# Patient Record
Sex: FEMALE | Race: WHITE | NOT HISPANIC OR LATINO | Employment: FULL TIME
[De-identification: names, ages, dates, MRNs, and addresses within clinical notes are randomized per-mention and may not be internally consistent; named-entity substitution may affect disease eponyms.]

---

## 2017-10-07 ENCOUNTER — HEALTH MAINTENANCE LETTER (OUTPATIENT)
Age: 37
End: 2017-10-07

## 2017-11-11 ENCOUNTER — HEALTH MAINTENANCE LETTER (OUTPATIENT)
Age: 37
End: 2017-11-11

## 2018-05-03 ENCOUNTER — OFFICE VISIT (OUTPATIENT)
Dept: FAMILY MEDICINE | Facility: CLINIC | Age: 38
End: 2018-05-03
Payer: COMMERCIAL

## 2018-05-03 VITALS
HEART RATE: 76 BPM | WEIGHT: 235 LBS | SYSTOLIC BLOOD PRESSURE: 120 MMHG | DIASTOLIC BLOOD PRESSURE: 68 MMHG | TEMPERATURE: 98.6 F | BODY MASS INDEX: 33.64 KG/M2 | HEIGHT: 70 IN

## 2018-05-03 DIAGNOSIS — Z00.00 ROUTINE GENERAL MEDICAL EXAMINATION AT A HEALTH CARE FACILITY: Primary | ICD-10-CM

## 2018-05-03 DIAGNOSIS — Z30.46 ENCOUNTER FOR SURVEILLANCE OF IMPLANTABLE SUBDERMAL CONTRACEPTIVE: ICD-10-CM

## 2018-05-03 DIAGNOSIS — Z12.4 SCREENING FOR MALIGNANT NEOPLASM OF CERVIX: ICD-10-CM

## 2018-05-03 DIAGNOSIS — Z11.3 SCREEN FOR STD (SEXUALLY TRANSMITTED DISEASE): ICD-10-CM

## 2018-05-03 PROCEDURE — 87591 N.GONORRHOEAE DNA AMP PROB: CPT | Performed by: FAMILY MEDICINE

## 2018-05-03 PROCEDURE — 87491 CHLMYD TRACH DNA AMP PROBE: CPT | Performed by: FAMILY MEDICINE

## 2018-05-03 PROCEDURE — 88175 CYTOPATH C/V AUTO FLUID REDO: CPT | Performed by: FAMILY MEDICINE

## 2018-05-03 PROCEDURE — G0476 HPV COMBO ASSAY CA SCREEN: HCPCS | Performed by: FAMILY MEDICINE

## 2018-05-03 PROCEDURE — 99395 PREV VISIT EST AGE 18-39: CPT | Performed by: FAMILY MEDICINE

## 2018-05-03 NOTE — MR AVS SNAPSHOT
After Visit Summary   5/3/2018    Shannen Guzmán    MRN: [id number]           Patient Information     Date Of Birth          1980        Visit Information        Provider Department      5/3/2018 2:20 PM Maricarmen Irving MD [facility]        Today's Diagnoses     Routine general medical examination at a health care facility    -  1    Encounter for surveillance of implantable subdermal contraceptive        Screening for malignant neoplasm of cervix        Screen for STD (sexually transmitted disease)          Care Instructions      Preventive Health Recommendations  Female Ages 26 - 39  Yearly exam:   See your health care provider every year in order to    Review health changes.     Discuss preventive care.      Review your medicines if you your doctor has prescribed any.    Until age 30: Get a Pap test every three years (more often if you have had an abnormal result).    After age 30: Talk to your doctor about whether you should have a Pap test every 3 years or have a Pap test with HPV screening every 5 years.   You do not need a Pap test if your uterus was removed (hysterectomy) and you have not had cancer.  You should be tested each year for STDs (sexually transmitted diseases), if you're at risk.   Talk to your provider about how often to have your cholesterol checked.  If you are at risk for diabetes, you should have a diabetes test (fasting glucose).  Shots: Get a flu shot each year. Get a tetanus shot every 10 years.   Nutrition:     Eat at least 5 servings of fruits and vegetables each day.    Eat whole-grain bread, whole-wheat pasta and brown rice instead of white grains and rice.    Talk to your provider about Calcium and Vitamin D.     Lifestyle    Exercise at least 150 minutes a week (30 minutes a day, 5 days of the week). This will help you control your weight and prevent disease.    Limit alcohol to one drink per day.    No smoking.     Wear sunscreen  to prevent skin cancer.    See your dentist every six months for an exam and cleaning.  [facility]   Discharged by : Della Pappas CMA  Paper scripts provided to patient : no     If you have any questions regarding your visit please contact your care team:     Team Gold                Clinic Hours Telephone Number     Dr. Brandi Gonzalez, NP 7am-7pm  Monday - Thursday   7am-5pm  Fridays  [phone]   (Appointment scheduling available 24/7)     RN Line  [phone] option 2     [facility] [place] [facility] 11am-9pm Monday-Friday Saturday-Sunday- 9am-5pm     [place] -   5pm-9pm Monday-Friday Saturday-Sunday- 9am-5pm    [phone] - [place]    [phone] - [place]       For a Price Quote for your services, please call our Consumer Price Line at [phone].     What options do I have for visits at the clinic other than the traditional office visit?     To expand how we care for you, many of our providers are utilizing electronic visits (e-visits) and telephone visits, when medically appropriate, for interactions with their patients rather than a visit in the clinic. We also offer nurse visits for many medical concerns. Just like any other service, we will bill your insurance company for this type of visit based on time spent on the phone with your provider. Not all insurance companies cover these visits. Please check with your medical insurance if this type of visit is covered. You will be responsible for any charges that are not paid by your insurance.   E-visits via [employer]: generally incur a $35.00 fee.     Telephone visits:  Time spent on the phone: *charged based on time that is spent on the phone in increments of 10 minutes. Estimated cost:   5-10 mins $30.00   11-20 mins. $59.00   21-30 mins. $85.00       Use [employer] (secure email communication and access to your  chart) to send your primary care provider a message or make an appointment. Ask someone on your Team how to sign up for [email or web address].     As always, Thank you for trusting us with your health care needs!      [facility]:    Scheduling Appointments  [place] [place]  Call: [phone]    [facility] Gallow[facility], [facility]  Call: [phone]    [facility]  Call: [phone]    For Gastroenterology referrals   [facility]   Clinics and Surgery [place], 4th Floor   [address] [place] [address]   Appointments: [phone]    WHERE TO GO FOR CARE?  Clinic    Make an appointment if you:       Are sick (cold, cough, flu, sore throat, earache or in pain).       Have a small injury (sprain, small cut, burn or broken bone).       Need a physical exam, Pap smear, vaccine or prescription refill.       Have questions about your health or medicines.    To reach us:      Call [phone] ([phone]). Open 24 hours every day. (For counseling services, call [phone].)    Log into [email or web address] at [email or web address]. (Visit [email or web address] to create an account.) Hospital emergency room    An emergency is a serious or life- threatening problem that must be treated right away.    Call [phone] or get to the hospital if you have:      Very bad or sudden:            - Chest pain or pressure         - Bleeding         - Head or belly pain         - Dizziness or trouble seeing, walking or                          Speaking      Problems breathing      Blood in your vomit or you are coughing up blood      A major injury (knocked out, loss of a finger or limb, rape, broken bone protruding from skin)    A mental health crisis. (Or call the Mental Health Crisis line at 1-140.519.7386 or Suicide Prevention Hotline at 1-612.801.4810.)    Open 24 hours every day. You don't need an appointment.     Urgent care    Visit urgent care for sickness or  small injuries when the clinic is closed. You don't need an appointment. To check hours or find an urgent care near you, visit [email or web address]. Online care    [facility] from [facility] for more than 70 common problems, like colds, allergies and infections. Open 24 hours every day at:   [email or web address]   Need help deciding?    For advice about where to be seen, you may call your clinic and ask to speak with a nurse. We're here for you 24 hours every day.         If you are deaf or hard of hearing, please let us know. We provide many free services including sign language interpreters, oral interpreters, TTYs, telephone amplifiers, note takers and written materials.                   Follow-ups after your visit        Your next 10 appointments already scheduled     May 23, 2018 10:30 AM CDT   Office Visit with Shannen Viramontes MD   [facility] ([facility]    [address] [place] [id number] [phone]           Bring a current list of meds and any records pertaining to this visit. For Physicals, please bring immunization records and any forms needing to be filled out. Please arrive 10 minutes early to complete paperwork.              Who to contact     If you have questions or need follow up information about today's clinic visit or your schedule please contact [facility] directly at [phone].  Normal or non-critical lab and imaging results will be communicated to you by MyChart, letter or phone within 4 business days after the clinic has received the results. If you do not hear from us within 7 days, please contact the clinic through MyChart or phone. If you have a critical or abnormal lab result, we will notify you by phone as soon as possible.  Submit refill requests through [employer] or call your pharmacy and they will forward the refill request to us. Please allow 3 business days for your refill to be completed.           "Additional Information About Your Visit        MyChart Information     [employer] gives you secure access to your electronic health record. If you see a primary care provider, you can also send messages to your care team and make appointments. If you have questions, please call your primary care clinic.  If you do not have a primary care provider, please call [phone] and they will assist you.        Care EveryWhere ID     This is your Care EveryWhere ID. This could be used by other organizations to access your [facility] medical records  [id number]        Your Vitals Were     Pulse Temperature Height Last Period BMI (Body Mass Index)       76 98.6  F (37  C) (Oral) 5' 9.5\" (1.765 m) 05/03/2018 34.21 kg/m2        Blood Pressure from Last 3 Encounters:   05/03/18 120/68   09/02/16 110/74   11/06/15 124/68    Weight from Last 3 Encounters:   05/03/18 235 lb (106.6 kg)   09/02/16 262 lb (118.8 kg)   11/06/15 255 lb (115.7 kg)              We Performed the Following     CHLAMYDIA TRACHOMATIS PCR     HPV High Risk Types DNA Cervical     NEISSERIA GONORRHOEA PCR     Pap imaged thin layer diagnostic with HPV (select HPV order below)        Primary Care Provider Office Phone # Fax #    Maricarmen Rebecca Irving [phone]       [facility] [address]        Equal Access to Services     ALESIA FRANCISCO : Hadii tana verma Solenny, waaxda luqadaha, qaybta kaalmada gera, josephine mon . So [facility] [phone].    ATENCIÓN: Si habla español, tiene a multani disposición servicios gratuitos de asistencia lingüística. Llame al [phone].    We comply with applicable federal civil rights laws and [place] laws. We do not discriminate on the basis of race, color, national origin, age, disability, sex, sexual orientation, or gender identity.            Thank you!     Thank you for choosing [facility]  for your care. Our goal is always to provide " you with excellent care. Hearing back from our patients is one way we can continue to improve our services. Please take a few minutes to complete the written survey that you may receive in the mail after your visit with us. Thank you!             Your Updated Medication List - Protect others around you: Learn how to safely use, store and throw away your medicines at www.disposemymeds.org.          This list is accurate as of 5/3/18  3:07 PM.  Always use your most recent med list.                   Brand Name Dispense Instructions for use Diagnosis    etonogestrel 68 MG Impl    IMPLANON    1 each    1 each (68 mg) by Subdermal route once for 1 dose    Nexplanon insertion

## 2018-05-03 NOTE — NURSING NOTE
"Chief Complaint   Patient presents with     Physical     Initial /68 (BP Location: Right arm, Patient Position: Sitting, Cuff Size: Adult Large)  Pulse 76  Temp 98.6  F (37  C) (Oral)  Ht 5' 9.5\" (1.765 m)  Wt 235 lb (106.6 kg)  LMP 05/03/2018  BMI 34.21 kg/m2 Estimated body mass index is 34.21 kg/(m^2) as calculated from the following:    Height as of this encounter: 5' 9.5\" (1.765 m).    Weight as of this encounter: 235 lb (106.6 kg).  BP completed using cuff size: large-right arm.    Fatuma Vega MA      "
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
## 2018-05-03 NOTE — PATIENT INSTRUCTIONS
Preventive Health Recommendations  Female Ages 26 - 39  Yearly exam:   See your health care provider every year in order to    Review health changes.     Discuss preventive care.      Review your medicines if you your doctor has prescribed any.    Until age 30: Get a Pap test every three years (more often if you have had an abnormal result).    After age 30: Talk to your doctor about whether you should have a Pap test every 3 years or have a Pap test with HPV screening every 5 years.   You do not need a Pap test if your uterus was removed (hysterectomy) and you have not had cancer.  You should be tested each year for STDs (sexually transmitted diseases), if you're at risk.   Talk to your provider about how often to have your cholesterol checked.  If you are at risk for diabetes, you should have a diabetes test (fasting glucose).  Shots: Get a flu shot each year. Get a tetanus shot every 10 years.   Nutrition:     Eat at least 5 servings of fruits and vegetables each day.    Eat whole-grain bread, whole-wheat pasta and brown rice instead of white grains and rice.    Talk to your provider about Calcium and Vitamin D.     Lifestyle    Exercise at least 150 minutes a week (30 minutes a day, 5 days of the week). This will help you control your weight and prevent disease.    Limit alcohol to one drink per day.    No smoking.     Wear sunscreen to prevent skin cancer.    See your dentist every six months for an exam and cleaning.  [facility]   Discharged by : Della Pappas CMA  Paper scripts provided to patient : no     If you have any questions regarding your visit please contact your care team:     Team Gold                Clinic Hours Telephone Number     Dr. Brandi Gonzalez NP 7am-7pm  Monday - Thursday   7am-5pm  Fridays  [phone]   (Appointment scheduling available 24/7)     RN Line  [phone] option 2      Urgent Care - [place] and [place] - 11am-9pm Monday-Friday Saturday-Sunday- 9am-5pm     [place] -   5pm-9pm Monday-Friday Saturday-Sunday- 9am-5pm    [phone] - [place]    [phone] - [place]       For a Price Quote for your services, please call our Consumer Price Line at [phone].     What options do I have for visits at the clinic other than the traditional office visit?     To expand how we care for you, many of our providers are utilizing electronic visits (e-visits) and telephone visits, when medically appropriate, for interactions with their patients rather than a visit in the clinic. We also offer nurse visits for many medical concerns. Just like any other service, we will bill your insurance company for this type of visit based on time spent on the phone with your provider. Not all insurance companies cover these visits. Please check with your medical insurance if this type of visit is covered. You will be responsible for any charges that are not paid by your insurance.   E-visits via POWhart: generally incur a $35.00 fee.     Telephone visits:  Time spent on the phone: *charged based on time that is spent on the phone in increments of 10 minutes. Estimated cost:   5-10 mins $30.00   11-20 mins. $59.00   21-30 mins. $85.00       Use POWhart (secure email communication and access to your chart) to send your primary care provider a message or make an appointment. Ask someone on your Team how to sign up for Diversiont.     As always, Thank you for trusting us with your health care needs!      [facility]:    Scheduling Appointments  [facility]  Call: [phone]    [facility]  Call: [phone]    [facility]  Call: [phone]    For Gastroenterology referrals   [facility]   Clinics and Surgery Center, 4th Floor   [address] [place] [address]    Appointments: [phone]    WHERE TO GO FOR CARE?  Clinic    Make an appointment if you:       Are sick (cold, cough, flu, sore throat, earache or in pain).       Have a small injury (sprain, small cut, burn or broken bone).       Need a physical exam, Pap smear, vaccine or prescription refill.       Have questions about your health or medicines.    To reach us:      Call [phone] ([phone]). Open 24 hours every day. (For counseling services, call [phone].)    Log into [employer] at X[employer]. (Visit [employer] to create an account.) Hospital emergency room    An emergency is a serious or life- threatening problem that must be treated right away.    Call 701 or get to the hospital if you have:      Very bad or sudden:            - Chest pain or pressure         - Bleeding         - Head or belly pain         - Dizziness or trouble seeing, walking or                          Speaking      Problems breathing      Blood in your vomit or you are coughing up blood      A major injury (knocked out, loss of a finger or limb, rape, broken bone protruding from skin)    A mental health crisis. (Or call the Mental Health Crisis line at 1-998.578.8974 or Suicide Prevention Hotline at 1-123.685.8095.)    Open 24 hours every day. You don't need an appointment.     Urgent care    Visit urgent care for sickness or small injuries when the clinic is closed. You don't need an appointment. To check hours or find an urgent care near you, visit [email or web address]. Online care    Get online care from [facility] for more than 70 common problems, like colds, allergies and infections. Open 24 hours every day at:   [email or web address]   Need help deciding?    For advice about where to be seen, you may call your clinic and ask to speak with a nurse. We're here for you 24 hours every day.         If you are deaf or hard of hearing, please let us know. We provide many free services including sign language  interpreters, oral interpreters, TTYs, telephone amplifiers, note takers and written materials.

## 2018-05-04 LAB
C TRACH DNA SPEC QL NAA+PROBE: NEGATIVE
N GONORRHOEA DNA SPEC QL NAA+PROBE: NEGATIVE
SPECIMEN SOURCE: NORMAL
SPECIMEN SOURCE: NORMAL

## 2018-05-07 LAB
COPATH REPORT: NORMAL
PAP: NORMAL

## 2018-05-09 LAB
FINAL DIAGNOSIS: ABNORMAL
HPV HR 12 DNA CVX QL NAA+PROBE: POSITIVE
HPV16 DNA SPEC QL NAA+PROBE: NEGATIVE
HPV18 DNA SPEC QL NAA+PROBE: NEGATIVE
SPECIMEN DESCRIPTION: ABNORMAL
SPECIMEN SOURCE CVX/VAG CYTO: ABNORMAL

## 2018-05-23 ENCOUNTER — OFFICE VISIT (OUTPATIENT)
Dept: OBGYN | Facility: CLINIC | Age: 38
End: 2018-05-23
Payer: COMMERCIAL

## 2018-05-23 VITALS
BODY MASS INDEX: 32.78 KG/M2 | SYSTOLIC BLOOD PRESSURE: 128 MMHG | WEIGHT: 229 LBS | TEMPERATURE: 98.8 F | DIASTOLIC BLOOD PRESSURE: 70 MMHG | HEIGHT: 70 IN

## 2018-05-23 DIAGNOSIS — Z30.46 NEXPLANON REMOVAL: ICD-10-CM

## 2018-05-23 DIAGNOSIS — Z30.017 NEXPLANON INSERTION: Primary | ICD-10-CM

## 2018-05-23 LAB — BETA HCG QUAL IFA URINE: NEGATIVE

## 2018-05-23 PROCEDURE — 84703 CHORIONIC GONADOTROPIN ASSAY: CPT | Performed by: OBSTETRICS & GYNECOLOGY

## 2018-05-23 PROCEDURE — 11983 REMOVE/INSERT DRUG IMPLANT: CPT | Performed by: OBSTETRICS & GYNECOLOGY

## 2018-05-23 NOTE — NURSING NOTE
"Chief Complaint   Patient presents with     Contraception     nexplanon replacement.     Other     Nexplanon. NDC: 0827-0663-20 LOT: [id number] EXP: 10/2020       Initial /70  Temp 98.8  F (37.1  C) (Oral)  Ht 5' 9.5\" (1.765 m)  Wt 229 lb (103.9 kg)  LMP 2018  BMI 33.33 kg/m2 Estimated body mass index is 33.33 kg/(m^2) as calculated from the following:    Height as of this encounter: 5' 9.5\" (1.765 m).    Weight as of this encounter: 229 lb (103.9 kg).  BP completed using cuff size: large        The following HM Due: NONE      The following patient reported/Care Every where data was sent to:  P ABSTRACT QUALITY INITIATIVES [[id number]]        patient has appointment for today    Erika Dorantes CMA                "

## 2018-05-23 NOTE — PROGRESS NOTES
Procedure: Nexplanon removal  PARQ was held and consents signed. Patient was placed in dorsal supine position with rightt arm abducted and externally rotated. Nexplanon was palpated under skin. The area was cleansed with betadine. The distal site was injected with 1 ml of 1% plain lidocaine.  While pushing down on the proximal end, 2 mm incision was made over the distal implant with an 11 blade scalpel. The implant was grasped with a mosquito forceps and removed intact. The skin was closed with dermabond. A pressure bandage was placed for the next 6 hours. The patient tolerated the procedure well.       NEXPLANON INSERTION PROCEDURE    Shannen Guzmán is a 38 year old  who presents for Nexplanon insertion. Indication for nexplanon insertion is contraception. Patient's last menstrual period was 2018.. The patient is currently using Nexplanon  for contraception.     Tests:  UPT neg    A complete discussion of the risks and benefits of Nexplanon use and the details of the insertion procedure was held with the patient. The anticipated bleeding profile was specifically discussed and patient voiced understanding. All questions were answered. A consent form was signed.      Prior to the beginning of the procedure the team paused to verify the patient's identity, as well as the procedure to be performed and the correct side/site. All equipment required was ready and available. The patient was positioned appropriately.     Preprocedure medications: 1% plain lidocaine, 3 ml  Nexplanon Lot # NDC: [id number] LOT: [id number] EXP: 10/2020    Patient's allergies were confirmed. The patient was placed in the supine position with her right arm flexed at the elbow, externally rotated, and placed with her wrist parallel to her ear. The insertion site was identified 8-10 cm above the medial epicondyle of the humerus at the inner aspect of the arm. The insertion site was marked with a sterile marker. The direction of  insertion was also indicated with a kalpesh a few centimeters proximal. The insertion area was cleaned with betadine swabs and anesthetized with 2 ml of 1% lidocaine without epinephrine along the planned insertion tunnel.  The Nexplanon applicator was removed from its blister. The needle shield was removed, maintaining the applicator upright. The white implant was visualized in the needle tip. Counter-traction was applied to the skin at the marked needle insertion site.  The tip of the needle was inserted at the site, beveled side up, at a 30-degree angle. The applicator was then lowered to a horizontal position. While lifting the skin with the tip of the needle, the needle was inserted to its full length. The slider was unlocked and moved fully back to the stop.   The 4 cm crystal was palpated under the skin. The patient also palpated the crystal. A pressure bandage was applied with sterile gauze. The patient was instructed to remove the bangage in several hours and replace with a band-aid.    The user card was filled out and given to the patient to keep.  The Patient Chart Label was completed and sent for scanning.    PLAN:   The patient was asked to contact the clinic for any fever/chills/insertion site pain or heavy bleeding.     FOLLOW-UP:  She was asked to follow up for any problems.

## 2018-05-23 NOTE — MR AVS SNAPSHOT
"              After Visit Summary   5/23/2018    Shannen Guzmán    MRN: [id number]           Patient Information     Date Of Birth          1980        Visit Information        Provider Department      5/23/2018 10:30 Shannen Buckley MD [facility]        Today's Diagnoses     Nexplanon insertion    -  1    Nexplanon removal           Follow-ups after your visit        Who to contact     If you have questions or need follow up information about today's clinic visit or your schedule please contact [facility] directly at [phone].  Normal or non-critical lab and imaging results will be communicated to you by Atmoceanhart, letter or phone within 4 business days after the clinic has received the results. If you do not hear from us within 7 days, please contact the clinic through Atmoceanhart or phone. If you have a critical or abnormal lab result, we will notify you by phone as soon as possible.  Submit refill requests through [employer] or call your pharmacy and they will forward the refill request to us. Please allow 3 business days for your refill to be completed.          Additional Information About Your Visit        MyChart Information     [employer] gives you secure access to your electronic health record. If you see a primary care provider, you can also send messages to your care team and make appointments. If you have questions, please call your primary care clinic.  If you do not have a primary care provider, please call [phone] and they will assist you.        Care EveryWhere ID     This is your Care EveryWhere ID. This could be used by other organizations to access your [facility] medical records  [id number]        Your Vitals Were     Temperature Height Last Period BMI (Body Mass Index)          98.8  F (37.1  C) (Oral) 5' 9.5\" (1.765 m) 05/03/2018 33.33 kg/m2         Blood Pressure from Last 3 Encounters:   05/23/18 128/70   05/03/18 120/68   09/02/16 " 110/74    Weight from Last 3 Encounters:   05/23/18 229 lb (103.9 kg)   05/03/18 235 lb (106.6 kg)   09/02/16 262 lb (118.8 kg)              We Performed the Following     Beta HCG qual IFA urine     ETONOGESTREL IMPLANT SYSTEM     INSERTION NON-BIODEGRADABLE DRUG DELIVERY IMPLANT     REMOVE & REINSERT NON-BIODEGRADABLE DRUG DELIVERY IMPLANT          Today's Medication Changes          These changes are accurate as of 5/23/18 11:16 AM.  If you have any questions, ask your nurse or doctor.               These medicines have changed or have updated prescriptions.        Dose/Directions    * etonogestrel 68 MG Impl   Commonly known as:  IMPLANON   This may have changed:  Another medication with the same name was added. Make sure you understand how and when to take each.   Used for:  Nexplanon insertion   Changed by:  Shannen Viramontes MD        Dose:  1 each   1 each (68 mg) by Subdermal route once for 1 dose   Quantity:  1 each   Refills:  0       * etonogestrel 68 MG Impl   Commonly known as:  IMPLANON   This may have changed:  You were already taking a medication with the same name, and this prescription was added. Make sure you understand how and when to take each.   Used for:  Nexplanon insertion   Changed by:  Shannen Viramontes MD        Dose:  1 each   1 each (68 mg) by Subdermal route once for 1 dose   Quantity:  1 each   Refills:  0       * Notice:  This list has 2 medication(s) that are the same as other medications prescribed for you. Read the directions carefully, and ask your doctor or other care provider to review them with you.         Where to get your medicines      Some of these will need a paper prescription and others can be bought over the counter.  Ask your nurse if you have questions.     You don't need a prescription for these medications     etonogestrel 68 MG Impl                Primary Care Provider Office Phone # Fax #    Maricarmen Irving [phone]        [address] [facility] [address]        Equal Access to Services     ALESIA FRANCISCO : Hadii aad ku hadpiercesandrine Garcia, wapatrick barrera, qabolivar flowermadella boss, josephine haysjeanniedasha thompson. So [facility] [phone].    ATENCIÓN: Si habla español, tiene a multani disposición servicios gratuitos de asistencia lingüística. Llame al [phone].    We comply with applicable federal civil rights laws and [place] laws. We do not discriminate on the basis of race, color, national origin, age, disability, sex, sexual orientation, or gender identity.            Thank you!     Thank you for choosing [facility]  for your care. Our goal is always to provide you with excellent care. Hearing back from our patients is one way we can continue to improve our services. Please take a few minutes to complete the written survey that you may receive in the mail after your visit with us. Thank you!             Your Updated Medication List - Protect others around you: Learn how to safely use, store and throw away your medicines at www.disposemymeds.org.          This list is accurate as of 5/23/18 11:16 AM.  Always use your most recent med list.                   Brand Name Dispense Instructions for use Diagnosis    * etonogestrel 68 MG Impl    IMPLANON    1 each    1 each (68 mg) by Subdermal route once for 1 dose    Nexplanon insertion       * etonogestrel 68 MG Impl    IMPLANON    1 each    1 each (68 mg) by Subdermal route once for 1 dose    Nexplanon insertion       * Notice:  This list has 2 medication(s) that are the same as other medications prescribed for you. Read the directions carefully, and ask your doctor or other care provider to review them with you.

## 2018-08-16 ENCOUNTER — OFFICE VISIT (OUTPATIENT)
Dept: OBGYN | Facility: CLINIC | Age: 38
End: 2018-08-16
Payer: COMMERCIAL

## 2018-08-16 VITALS
HEIGHT: 70 IN | BODY MASS INDEX: 34.07 KG/M2 | SYSTOLIC BLOOD PRESSURE: 136 MMHG | DIASTOLIC BLOOD PRESSURE: 74 MMHG | WEIGHT: 238 LBS

## 2018-08-16 DIAGNOSIS — R87.810 CERVICAL HIGH RISK HPV (HUMAN PAPILLOMAVIRUS) TEST POSITIVE: Primary | ICD-10-CM

## 2018-08-16 LAB — BETA HCG QUAL IFA URINE: NEGATIVE

## 2018-08-16 PROCEDURE — 57456 ENDOCERV CURETTAGE W/SCOPE: CPT | Performed by: OBSTETRICS & GYNECOLOGY

## 2018-08-16 PROCEDURE — 84703 CHORIONIC GONADOTROPIN ASSAY: CPT | Performed by: OBSTETRICS & GYNECOLOGY

## 2018-08-16 PROCEDURE — 88305 TISSUE EXAM BY PATHOLOGIST: CPT | Performed by: OBSTETRICS & GYNECOLOGY

## 2018-08-16 NOTE — MR AVS SNAPSHOT
After Visit Summary   8/16/2018    Shannen Guzmán    MRN: [id number]           Patient Information     Date Of Birth          1980        Visit Information        Provider Department      8/16/2018 1:30 PM Shannen Viramontes MD; [facility] OB/[facility]        Today's Diagnoses     Cervical high risk HPV (human papillomavirus) test positive    -  1      Care Instructions    Colposcopy Post-Procedure Patient Instructions    You may experience any of the following for a couple of days following colposcopy:    Mild cramping    Vaginal bleeding     Vaginal discharge that looks black and clumpy    Please call your healthcare provider if you have any of the following symptoms:    Fever--Temperature greater than 100 degrees    Cramping after 48 hours    Bleeding heavier than a normal period in the first 24-48 hours    If you are bleeding and soaking more than one pad an hour    Or any other worrisome problems.    We recommend that:    You use pads, not tampons for the bleeding.    You may resume sexual activity in 2-3 days, or after bleeding stops.    You may use Tylenol or ibuprofen (Motrin or Advil) for any discomfort.      [facility] - OB/GYN : [phone]              Follow-ups after your visit        Who to contact     If you have questions or need follow up information about today's clinic visit or your schedule please contact [facility] directly at [phone].  Normal or non-critical lab and imaging results will be communicated to you by MyChart, letter or phone within 4 business days after the clinic has received the results. If you do not hear from us within 7 days, please contact the clinic through MyChart or phone. If you have a critical or abnormal lab result, we will notify you by phone as soon as possible.  Submit refill requests through [employer] or call your pharmacy and they will forward the refill request to us.  "Please allow 3 business days for your refill to be completed.          Additional Information About Your Visit        MyChart Information     Save22hart gives you secure access to your electronic health record. If you see a primary care provider, you can also send messages to your care team and make appointments. If you have questions, please call your primary care clinic.  If you do not have a primary care provider, please call [phone] and they will assist you.        Care EveryWhere ID     This is your Care EveryWhere ID. This could be used by other organizations to access your [facility] medical records  [id number]        Your Vitals Were     Height Last Period BMI (Body Mass Index)             5' 9.5\" (1.765 m) 08/06/2018 34.64 kg/m2          Blood Pressure from Last 3 Encounters:   08/16/18 136/74   05/23/18 128/70   05/03/18 120/68    Weight from Last 3 Encounters:   08/16/18 238 lb (108 kg)   05/23/18 229 lb (103.9 kg)   05/03/18 235 lb (106.6 kg)              We Performed the Following     Beta HCG qual IFA urine        Primary Care Provider Office Phone # Fax #    Maricarmen Rebecca Irving [phone]       [address] [facility] [address]        Equal Access to Services     ALESIA FRANCISCO : Hadii tana ku hadasho Soomaali, waaxda luqadaha, qaybta kaalmada adeegyada, waxay mayrain haychristinan viola thompson. So [facility] [phone].    ATENCIÓN: Si habla español, tiene a multani disposición servicios gratuitos de asistencia lingüística. Llame al [phone].    We comply with applicable federal civil rights laws and [place] laws. We do not discriminate on the basis of race, color, national origin, age, disability, sex, sexual orientation, or gender identity.            Thank you!     Thank you for choosing [facility]  for your care. Our goal is always to provide you with excellent care. Hearing back from our patients is one way we can continue to improve our services. " Please take a few minutes to complete the written survey that you may receive in the mail after your visit with us. Thank you!             Your Updated Medication List - Protect others around you: Learn how to safely use, store and throw away your medicines at www.disposemymeds.org.          This list is accurate as of 8/16/18  1:35 PM.  Always use your most recent med list.                   Brand Name Dispense Instructions for use Diagnosis    etonogestrel 68 MG Impl    IMPLANON    1 each    1 each (68 mg) by Subdermal route once for 1 dose    Nexplanon insertion

## 2018-08-16 NOTE — PROGRESS NOTES
Shannen Guzmán is a 38 year old female  who presents for repeat colposcopy, referred by Dr. Irving. Pap smear on 5/3/3/18 showed: normal and with high risk HPV present: not 16/18. The prior pap showed normal and with high risk HPV present: not 16/18.     07  ASCUS pap/ neg HR HPV. Plan: cotest in 3 years.   , , ,  All NIL paps.  14 NIL pap  16 NIL pap/+ HR HPV (not 16 or 18). Plan: cotest in 1 year, due by 9/2/17  5/3/18 NIL pap, + HR HPV (not 16 or 18). Plan: colp bef 8/3/18      No LMP recorded.  UPT today is negative  Patient does smoke  Type of contraception: nexplanon  Age at first sexual intercourse: 16  Number of sexual partners (lifetime): >6  Past GYN history: HPV  Prior cervical/vaginal disease: Normal exam without visible pathology.  Prior cervical treatment: no treatment.      PROCEDURE:      Before the procedure, it was ensured that the patient was educated regarding the nature of her findings to date, the implications, and what was to be done. She has been made aware of the role of HPV, the natural history of infection, ways to minimize her future risk, the effect of HPV on the cervix, and treatment options available should they be indicated. The details of the colposcopic procedure were reviewed. All questions were answered before proceeding, and informed consent was therefore obtained.      Speculum placed in vagina and excellent visualization of cervix acheived, cervix swabbed x 3 with acetic acid solution.      FINDINGS:  Cervix: no visible lesions  SCJ seen?: yes   ECC done?: Yes   Satisfactory examination?: no      ASSESSMENT: Normal exam without visible pathology.  PLAN: specimens labelled and sent to Pathology, will base further treatment on Pathology findings, post biopsy instructions given to patient, MyChart to discuss Pathology results and  Repeat pap smear in 12 months with cotest anticipated.      Shannen Viramontes MD

## 2018-08-16 NOTE — PATIENT INSTRUCTIONS

## 2018-08-16 NOTE — NURSING NOTE
"Chief Complaint   Patient presents with     Colposcopy       Initial /74  Ht 5' 9.5\" (1.765 m)  Wt 238 lb (108 kg)  LMP 2018  BMI 34.64 kg/m2 Estimated body mass index is 34.64 kg/(m^2) as calculated from the following:    Height as of this encounter: 5' 9.5\" (1.765 m).    Weight as of this encounter: 238 lb (108 kg).  BP completed using cuff size: large        The following HM Due: NONE      The following patient reported/Care Every where data was sent to:  P ABSTRACT QUALITY INITIATIVES [[id number]]        patient has appointment for today    Erika Dorantes CMA                "

## 2018-08-22 LAB — COPATH REPORT: NORMAL

## 2018-09-24 DIAGNOSIS — Z52.001 STEM CELL DONOR: ICD-10-CM

## 2018-09-24 DIAGNOSIS — Z86.2 PERSONAL HISTORY OF DISEASES OF BLOOD AND BLOOD-FORMING ORGANS: ICD-10-CM

## 2018-10-04 ENCOUNTER — OFFICE VISIT (OUTPATIENT)
Dept: TRANSPLANT | Facility: CLINIC | Age: 38
End: 2018-10-04

## 2018-10-04 VITALS
RESPIRATION RATE: 16 BRPM | HEART RATE: 104 BPM | HEIGHT: 69 IN | SYSTOLIC BLOOD PRESSURE: 138 MMHG | OXYGEN SATURATION: 97 % | BODY MASS INDEX: 35.09 KG/M2 | DIASTOLIC BLOOD PRESSURE: 84 MMHG | WEIGHT: 236.9 LBS | TEMPERATURE: 98.3 F

## 2018-10-04 VITALS
TEMPERATURE: 98.3 F | HEIGHT: 69 IN | DIASTOLIC BLOOD PRESSURE: 84 MMHG | RESPIRATION RATE: 16 BRPM | HEART RATE: 104 BPM | OXYGEN SATURATION: 97 % | WEIGHT: 236.9 LBS | SYSTOLIC BLOOD PRESSURE: 138 MMHG | BODY MASS INDEX: 35.09 KG/M2

## 2018-10-04 DIAGNOSIS — Z86.2 PERSONAL HISTORY OF DISEASES OF BLOOD AND BLOOD-FORMING ORGANS: ICD-10-CM

## 2018-10-04 DIAGNOSIS — Z52.001 DONOR OF STEM CELL: Primary | ICD-10-CM

## 2018-10-04 DIAGNOSIS — Z52.001 STEM CELL DONOR: Primary | ICD-10-CM

## 2018-10-04 DIAGNOSIS — Z52.001 STEM CELL DONOR: ICD-10-CM

## 2018-10-04 LAB
ABO + RH BLD: NORMAL
ABO + RH BLD: NORMAL
ALT SERPL W P-5'-P-CCNC: 19 U/L (ref 0–50)
BASOPHILS # BLD AUTO: 0 10E9/L (ref 0–0.2)
BASOPHILS NFR BLD AUTO: 0.3 %
BLD GP AB SCN SERPL QL: NORMAL
BLOOD BANK CMNT PATIENT-IMP: NORMAL
CREAT SERPL-MCNC: 0.77 MG/DL (ref 0.52–1.04)
DIFFERENTIAL METHOD BLD: NORMAL
EOSINOPHIL # BLD AUTO: 0.2 10E9/L (ref 0–0.7)
EOSINOPHIL NFR BLD AUTO: 2.2 %
ERYTHROCYTE [DISTWIDTH] IN BLOOD BY AUTOMATED COUNT: 13.5 % (ref 10–15)
GFR SERPL CREATININE-BSD FRML MDRD: 84 ML/MIN/1.7M2
HBV CORE AB SERPL QL IA: NONREACTIVE
HBV SURFACE AB SERPL IA-ACNC: 0.96 M[IU]/ML
HBV SURFACE AG SERPL QL IA: NONREACTIVE
HCG SERPL QL: NEGATIVE
HCT VFR BLD AUTO: 41.3 % (ref 35–47)
HCV AB SERPL QL IA: NONREACTIVE
HGB BLD-MCNC: 13.6 G/DL (ref 11.7–15.7)
HIV 1+2 AB+HIV1 P24 AG SERPL QL IA: NONREACTIVE
IMM GRANULOCYTES # BLD: 0 10E9/L (ref 0–0.4)
IMM GRANULOCYTES NFR BLD: 0.4 %
LYMPHOCYTES # BLD AUTO: 1.9 10E9/L (ref 0.8–5.3)
LYMPHOCYTES NFR BLD AUTO: 26.2 %
MCH RBC QN AUTO: 28.5 PG (ref 26.5–33)
MCHC RBC AUTO-ENTMCNC: 32.9 G/DL (ref 31.5–36.5)
MCV RBC AUTO: 86 FL (ref 78–100)
MONOCYTES # BLD AUTO: 0.3 10E9/L (ref 0–1.3)
MONOCYTES NFR BLD AUTO: 4.2 %
NEUTROPHILS # BLD AUTO: 4.8 10E9/L (ref 1.6–8.3)
NEUTROPHILS NFR BLD AUTO: 66.7 %
NRBC # BLD AUTO: 0 10*3/UL
NRBC BLD AUTO-RTO: 0 /100
PLATELET # BLD AUTO: 200 10E9/L (ref 150–450)
RBC # BLD AUTO: 4.78 10E12/L (ref 3.8–5.2)
SPECIMEN EXP DATE BLD: NORMAL
WBC # BLD AUTO: 7.1 10E9/L (ref 4–11)

## 2018-10-04 PROCEDURE — 86687 HTLV-I ANTIBODY: CPT

## 2018-10-04 PROCEDURE — 40000268 ZZH STATISTIC NO CHARGES: Mod: ZF

## 2018-10-04 PROCEDURE — 87389 HIV-1 AG W/HIV-1&-2 AB AG IA: CPT

## 2018-10-04 PROCEDURE — 86704 HEP B CORE ANTIBODY TOTAL: CPT

## 2018-10-04 PROCEDURE — 87516 HEPATITIS B DNA AMP PROBE: CPT

## 2018-10-04 PROCEDURE — 86850 RBC ANTIBODY SCREEN: CPT

## 2018-10-04 PROCEDURE — 86696 HERPES SIMPLEX TYPE 2 TEST: CPT

## 2018-10-04 PROCEDURE — 87798 DETECT AGENT NOS DNA AMP: CPT

## 2018-10-04 PROCEDURE — 87521 HEPATITIS C PROBE&RVRS TRNSC: CPT

## 2018-10-04 PROCEDURE — 87340 HEPATITIS B SURFACE AG IA: CPT

## 2018-10-04 PROCEDURE — 40000803 ZZHCL STATISTIC DNA ISOL HIGH PURITY

## 2018-10-04 PROCEDURE — 86803 HEPATITIS C AB TEST: CPT

## 2018-10-04 PROCEDURE — 86753 PROTOZOA ANTIBODY NOS: CPT

## 2018-10-04 PROCEDURE — 86703 HIV-1/HIV-2 1 RESULT ANTBDY: CPT

## 2018-10-04 PROCEDURE — 87535 HIV-1 PROBE&REVERSE TRNSCRPJ: CPT

## 2018-10-04 PROCEDURE — 85025 COMPLETE CBC W/AUTO DIFF WBC: CPT

## 2018-10-04 PROCEDURE — 86644 CMV ANTIBODY: CPT

## 2018-10-04 PROCEDURE — 82565 ASSAY OF CREATININE: CPT

## 2018-10-04 PROCEDURE — 86706 HEP B SURFACE ANTIBODY: CPT

## 2018-10-04 PROCEDURE — 86900 BLOOD TYPING SEROLOGIC ABO: CPT

## 2018-10-04 PROCEDURE — 86901 BLOOD TYPING SEROLOGIC RH(D): CPT

## 2018-10-04 PROCEDURE — 86780 TREPONEMA PALLIDUM: CPT

## 2018-10-04 PROCEDURE — 84460 ALANINE AMINO (ALT) (SGPT): CPT

## 2018-10-04 PROCEDURE — 86665 EPSTEIN-BARR CAPSID VCA: CPT

## 2018-10-04 PROCEDURE — 84703 CHORIONIC GONADOTROPIN ASSAY: CPT

## 2018-10-04 PROCEDURE — 86695 HERPES SIMPLEX TYPE 1 TEST: CPT

## 2018-10-04 ASSESSMENT — PAIN SCALES - GENERAL
PAINLEVEL: NO PAIN (1)
PAINLEVEL: NO PAIN (1)

## 2018-10-04 NOTE — MR AVS SNAPSHOT
After Visit Summary   10/4/2018    Shannen Guzmán    MRN: [id number]           Patient Information     Date Of Birth          1980        Visit Information        Provider Department      10/4/2018 2:30 PM  BMT [place] #1 [facility] Blood and Marrow Transplant        Today's Diagnoses     Donor of stem cell    -  1          [facility] [place]  [address] [place] [address]  Phone: [phone]  Clinic Hours:   Monday-Thursday:7am to 7pm   Friday: 7am to 5pm   Weekends and holidays:    8am to noon (in general)  If your fever is 100.5  or greater,   call the clinic.  After hours call the   hospital at [phone] or   [phone]. Ask for the BMT   fellow on-call           Care Instructions    No follow up to be scheduled.  She will come back when the nurse coordinators tells her to depending on closing recpient            Follow-ups after your visit        Who to contact     If you have questions or need follow up information about today's clinic visit or your schedule please contact [facility] BLOOD AND MARROW TRANSPLANT directly at [phone].  Normal or non-critical lab and imaging results will be communicated to you by [employer], letter or phone within 4 business days after the clinic has received the results. If you do not hear from us within 7 days, please contact the clinic through [employer] or phone. If you have a critical or abnormal lab result, we will notify you by phone as soon as possible.  Submit refill requests through [employer] or call your pharmacy and they will forward the refill request to us. Please allow 3 business days for your refill to be completed.          Additional Information About Your Visit        MyChart Information     Desall gives you secure access to your electronic health record. If you see a primary care provider, you can also send messages to your care team and make appointments. If you have questions, please call your primary care  "clinic.  If you do not have a primary care provider, please call [phone] and they will assist you.        Care EveryWhere ID     This is your Care EveryWhere ID. This could be used by other organizations to access your [place] medical records  [id number]        Your Vitals Were     Pulse Temperature Respirations Height Pulse Oximetry BMI (Body Mass Index)    104 98.3  F (36.8  C) (Oral) 16 1.74 m (5' 8.5\") 97% 35.5 kg/m2       Blood Pressure from Last 3 Encounters:   10/04/18 138/84   10/04/18 138/84   08/16/18 136/74    Weight from Last 3 Encounters:   10/04/18 107.5 kg (236 lb 14.4 oz)   10/04/18 107.5 kg (236 lb 14.4 oz)   08/16/18 108 kg (238 lb)              Today, you had the following     No orders found for display       Recent Review Flowsheet Data     BMT Recent Results Latest Ref Rng & Units 8/25/2011 9/13/2011 12/14/2011 4/3/2012 4/18/2012 4/20/2012 10/4/2018    WBC 4.0 - 11.0 10e9/L 7.8 6.4 - 8.4 6.9 - 7.1    Hemoglobin 11.7 - 15.7 g/dL 12.6 12.6 11.5(L) 13.1 12.8 12.1 13.6    Platelet Count 150 - 450 10e9/L 162 163 - 136(L) 118(L) - 200    Neutrophils (Absolute) 1.6 - 8.3 10e9/L 5.3 - - - 4.8 - 4.8    Sodium 133 - 144 mmol/L - - - - - - -    Potassium 3.4 - 5.3 mmol/L - - - - - - -    Chloride 94 - 109 mmol/L - - - - - - -    Glucose 60 - 99 mg/dL - - - - - - -    Urea Nitrogen 5 - 24 mg/dL - - - - - - -    Creatinine 0.52 - 1.04 mg/dL - - - - - - 0.77    Calcium (Total) 8.5 - 10.4 mg/dL - - - - - - -    ALT 0 - 50 U/L - - - - - - 19    MCV 78 - 100 fl 84 85 - 90 88 - 86               Primary Care Provider Office Phone # Fax #    Maricarmen Irving [phone]       [facility] [facility] [address]        Equal Access to Services     ALESIA FRANCISCO : Dmitri Garcia, sebastian barrera, josephine villagomez. [facility] [phone].    ATENCIÓN: Si habla español, tiene a multani disposición servicios gratuitos de " asistencia lingüística. Saray al [phone].    We comply with applicable federal civil rights laws and [place] laws. We do not discriminate on the basis of race, color, national origin, age, disability, sex, sexual orientation, or gender identity.            Thank you!     Thank you for choosing [facility] BLOOD AND MARROW TRANSPLANT  for your care. Our goal is always to provide you with excellent care. Hearing back from our patients is one way we can continue to improve our services. Please take a few minutes to complete the written survey that you may receive in the mail after your visit with us. Thank you!             Your Updated Medication List - Protect others around you: Learn how to safely use, store and throw away your medicines at www.disposemymeds.org.          This list is accurate as of 10/4/18  6:35 PM.  Always use your most recent med list.                   Brand Name Dispense Instructions for use Diagnosis    etonogestrel 68 MG Impl    IMPLANON    1 each    1 each (68 mg) by Subdermal route once for 1 dose    Nexplanon insertion

## 2018-10-04 NOTE — MR AVS SNAPSHOT
After Visit Summary   10/4/2018    Shannen Guzmán    MRN: [id number]           Patient Information     Date Of Birth          1980        Visit Information        Provider Department      10/4/2018 1:00 PM 1,  Bmt Nurse [facility] Blood and Marrow Transplant        Today's Diagnoses     Stem cell donor        Personal history of diseases of blood and blood-forming organs              [facility] [place] [place]  [address] [place] [address]  Phone: [phone]  Clinic Hours:   Monday-Thursday:7am to 7pm   Friday: 7am to 5pm   Weekends and holidays:    8am to noon (in general)  If your fever is 100.5  or greater,   call the clinic.  After hours call the   hospital at [phone] or   [phone]. Ask for the BMT   fellow on-call            Follow-ups after your visit        Who to contact     If you have questions or need follow up information about today's clinic visit or your schedule please contact [facility] BLOOD AND MARROW TRANSPLANT directly at [phone].  Normal or non-critical lab and imaging results will be communicated to you by [facility], letter or phone within 4 business days after the clinic has received the results. If you do not hear from us within 7 days, please contact the clinic through [employer] or phone. If you have a critical or abnormal lab result, we will notify you by phone as soon as possible.  Submit refill requests through [employer] or call your pharmacy and they will forward the refill request to us. Please allow 3 business days for your refill to be completed.          Additional Information About Your Visit        [employer] Guideshart Information     [employer] gives you secure access to your electronic health record. If you see a primary care provider, you can also send messages to your care team and make appointments. If you have questions, please call your primary care clinic.  If you do not have a primary care provider, please call [phone] and they  "will assist you.        Care EveryWhere ID     This is your Care EveryWhere ID. This could be used by other organizations to access your [place] medical records  [id number]        Your Vitals Were     Pulse Temperature Respirations Height Pulse Oximetry BMI (Body Mass Index)    104 98.3  F (36.8  C) (Oral) 16 1.74 m (5' 8.5\") 97% 35.49 kg/m2       Blood Pressure from Last 3 Encounters:   10/04/18 138/84   10/04/18 138/84   08/16/18 136/74    Weight from Last 3 Encounters:   10/04/18 107.5 kg (236 lb 14.4 oz)   10/04/18 107.5 kg (236 lb 14.4 oz)   08/16/18 108 kg (238 lb)              We Performed the Following     ABO/Rh type and screen     ALT     BMT Infectious Disease Donor Panel- SEND TO [facility]     CBC with platelets differential     Creatinine     EBV Capsid Antibody IgG - [facility]     HBV HCV HIV WNV by ANAND - SEND TO [facility]     Hepatitis B core antibody     Hepatitis B Surface Antibody     Hepatitis B surface antigen     Hepatitis C antibody     Herpes Simplex Virus 1 and 2 IgG -  Send to [facility]     HIV Antigen Antibody Combo     Order if female with child bearing potential: HCG qualitative     Pre BMT polymorphism determination donor        Recent Review Flowsheet Data     BMT Recent Results Latest Ref Rng & Units 8/25/2011 9/13/2011 12/14/2011 4/3/2012 4/18/2012 4/20/2012 10/4/2018    WBC 4.0 - 11.0 10e9/L 7.8 6.4 - 8.4 6.9 - 7.1    Hemoglobin 11.7 - 15.7 g/dL 12.6 12.6 11.5(L) 13.1 12.8 12.1 13.6    Platelet Count 150 - 450 10e9/L 162 163 - 136(L) 118(L) - 200    Neutrophils (Absolute) 1.6 - 8.3 10e9/L 5.3 - - - 4.8 - 4.8    Sodium 133 - 144 mmol/L - - - - - - -    Potassium 3.4 - 5.3 mmol/L - - - - - - -    Chloride 94 - 109 mmol/L - - - - - - -    Glucose 60 - 99 mg/dL - - - - - - -    Urea Nitrogen 5 - 24 mg/dL - - - - - - -    Creatinine 0.52 - 1.04 mg/dL - - - - - - 0.77    Calcium (Total) 8.5 - 10.4 mg/dL - - - - - - -    ALT 0 - 50 U/L - - - - - " - 19    MCV 78 - 100 fl 84 85 - 90 88 - 86               Primary Care Provider Office Phone # Fax #    Maricarmen Rebecca Irving [phone]       [address] [place] [address]        Equal Access to Services     ALESIA FRANCISCO : Hadii aad ku hadasho Soomaali, waaxda luqadaha, qaybta kaalmada adeegyada, waxay idiin hayaan adeeg khbishop lalissa thompson. So [facility] [phone].    ATENCIÓN: Si habla español, tiene a multani disposición servicios gratuitos de asistencia lingüística. Llame al [phone].    We comply with applicable federal civil rights laws and [place] laws. We do not discriminate on the basis of race, color, national origin, age, disability, sex, sexual orientation, or gender identity.            Thank you!     Thank you for choosing [facility] BLOOD AND MARROW TRANSPLANT  for your care. Our goal is always to provide you with excellent care. Hearing back from our patients is one way we can continue to improve our services. Please take a few minutes to complete the written survey that you may receive in the mail after your visit with us. Thank you!             Your Updated Medication List - Protect others around you: Learn how to safely use, store and throw away your medicines at www.disposemymeds.org.          This list is accurate as of 10/4/18  5:22 PM.  Always use your most recent med list.                   Brand Name Dispense Instructions for use Diagnosis    etonogestrel 68 MG Impl    IMPLANON    1 each    1 each (68 mg) by Subdermal route once for 1 dose    Nexplanon insertion

## 2018-10-04 NOTE — NURSING NOTE
BMT Teaching Flowsheet    Shannen Guzmán is a 38 year old female  Diagnoses of Stem cell donor and Personal history of diseases of blood and blood-forming organs were pertinent to this visit.    Teaching Topic: RDN Consents and Calender Review    Person(s) involved in teaching: Patient  Motivation Level  Asks Questions: Yes  Eager to Learn: Yes  Cooperative: Yes  Receptive (willing/able to accept information): Yes  Any cultural factors/Uatsdin beliefs that may influence understanding or compliance? No    Patient demonstrates understanding of the following:  - Reason for the appointment, diagnosis and treatment plan: Yes  - Knowledge of proper use of medications and conditions for which they are ordered (with special attention to potential side effects or drug interactions): Yes  - Which situations necessitate calling provider and whom to contact: Yes    Teaching concerns addressed: Reviewed and signed consents with pt , questions answered. Pt tolerated well.      Time spent with patient: 20 minutes.    Specific Concerns: NA

## 2018-10-04 NOTE — MR AVS SNAPSHOT
After Visit Summary   10/4/2018    Shannen Guzmán    MRN: [id number]           Patient Information     Date Of Birth          1980        Visit Information        Provider Department      10/4/2018 2:00 PM Coordinator, Janelle Bmt Nurse;  2 [address] CONS[address] [facility] Blood and Marrow Transplant        Today's Diagnoses     Stem cell donor        Personal history of diseases of blood and blood-forming organs              [facility] [place]  [address] [place] [address]  Phone: [phone]  Clinic Hours:   Monday-Thursday:7am to 7pm   Friday: 7am to 5pm   Weekends and holidays:    8am to noon (in general)  If your fever is 100.5  or greater,   call the clinic.  After hours call the   hospital at [phone] or   [phone]. Ask for the BMT   fellow on-call            Follow-ups after your visit        Who to contact     If you have questions or need follow up information about today's clinic visit or your schedule please contact [facility] BLOOD AND MARROW TRANSPLANT directly at [phone].  Normal or non-critical lab and imaging results will be communicated to you by [employer]ure[employer], letter or phone within 4 business days after the clinic has received the results. If you do not hear from us within 7 days, please contact the clinic through [employer] or phone. If you have a critical or abnormal lab result, we will notify you by phone as soon as possible.  Submit refill requests through "[employer] or call your pharmacy and they will forward the refill request to us. Please allow 3 business days for your refill to be completed.          Additional Information About Your Visit        Kines[employer] Capturehart Information     "[employer] gives you secure access to your electronic health record. If you see a primary care provider, you can also send messages to your care team and make appointments. If you have questions, please call your primary care clinic.  If you do not have a primary care provider,  please call [phone] and they will assist you.        Care EveryWhere ID     This is your Care EveryWhere ID. This could be used by other organizations to access your [place] medical records  [id number]         Blood Pressure from Last 3 Encounters:   10/04/18 138/84   10/04/18 138/84   08/16/18 136/74    Weight from Last 3 Encounters:   10/04/18 107.5 kg (236 lb 14.4 oz)   10/04/18 107.5 kg (236 lb 14.4 oz)   08/16/18 108 kg (238 lb)              Today, you had the following     No orders found for display       Recent Review Flowsheet Data     BMT Recent Results Latest Ref Rng & Units 8/25/2011 9/13/2011 12/14/2011 4/3/2012 4/18/2012 4/20/2012 10/4/2018    WBC 4.0 - 11.0 10e9/L 7.8 6.4 - 8.4 6.9 - 7.1    Hemoglobin 11.7 - 15.7 g/dL 12.6 12.6 11.5(L) 13.1 12.8 12.1 13.6    Platelet Count 150 - 450 10e9/L 162 163 - 136(L) 118(L) - 200    Neutrophils (Absolute) 1.6 - 8.3 10e9/L 5.3 - - - 4.8 - 4.8    Sodium 133 - 144 mmol/L - - - - - - -    Potassium 3.4 - 5.3 mmol/L - - - - - - -    Chloride 94 - 109 mmol/L - - - - - - -    Glucose 60 - 99 mg/dL - - - - - - -    Urea Nitrogen 5 - 24 mg/dL - - - - - - -    Creatinine 0.52 - 1.04 mg/dL - - - - - - 0.77    Calcium (Total) 8.5 - 10.4 mg/dL - - - - - - -    ALT 0 - 50 U/L - - - - - - 19    MCV 78 - 100 fl 84 85 - 90 88 - 86               Primary Care Provider Office Phone # Fax #    Maricarmen Irving [phone] [address] [facility] [address]        Equal Access to Services     [facility]: Dmitri Garcia, wajudithda luqadaha, qaybta kaalmadella boss, josephnie thopmson. So [facility] [phone].    ATENCIÓN: Si habla español, tiene a multani disposición servicios gratuitos de asistencia lingüística. Llame al [phone].    We comply with applicable federal civil rights laws and [place] laws. We do not discriminate on the basis of race, color, national origin, age, disability, sex, sexual  orientation, or gender identity.            Thank you!     Thank you for choosing [facility] BLOOD AND MARROW TRANSPLANT  for your care. Our goal is always to provide you with excellent care. Hearing back from our patients is one way we can continue to improve our services. Please take a few minutes to complete the written survey that you may receive in the mail after your visit with us. Thank you!             Your Updated Medication List - Protect others around you: Learn how to safely use, store and throw away your medicines at www.disposemymeds.org.          This list is accurate as of 10/4/18 11:59 PM.  Always use your most recent med list.                   Brand Name Dispense Instructions for use Diagnosis    etonogestrel 68 MG Impl    IMPLANON    1 each    1 each (68 mg) by Subdermal route once for 1 dose    Nexplanon insertion

## 2018-10-04 NOTE — NURSING NOTE
"Oncology Rooming Note    October 4, 2018 5:22 PM   Shannen Guzmán is a 38 year old female who presents for:    Chief Complaint   Patient presents with     RECHECK     RDN Consents and Calender Review      Initial Vitals: /84 (BP Location: Right arm, Patient Position: Sitting, Cuff Size: Adult Regular)  Pulse 104  Temp 98.3  F (36.8  C) (Oral)  Resp 16  Ht 1.74 m (5' 8.5\")  Wt 107.5 kg (236 lb 14.4 oz)  SpO2 97%  BMI 35.49 kg/m2 Estimated body mass index is 35.49 kg/(m^2) as calculated from the following:    Height as of this encounter: 1.74 m (5' 8.5\").    Weight as of this encounter: 107.5 kg (236 lb 14.4 oz). Body surface area is 2.28 meters squared.  No Pain (1) Comment: Data Unavailable   No LMP recorded.  Allergies reviewed: Yes  Medications reviewed: Yes    Medications: Medication refills not needed today.  Pharmacy name entered into [facility]:    [facility] [place], [place] - [address] [place] [address] PHARMACY [place], [place] - [address] [facility].    Clinical concerns: N/A      20 minutes for nursing intake (face to face time)     Birgit Westbrook CMA              "

## 2018-10-04 NOTE — PATIENT INSTRUCTIONS
No follow up to be scheduled.  She will come back when the nurse coordinators tells her to depending on closing recpient

## 2018-10-04 NOTE — NURSING NOTE
"Oncology Rooming Note    October 4, 2018 1:56 PM   Shannen Guzmán is a 38 year old female who presents for:    Chief Complaint   Patient presents with     RECHECK     RDN H&P     Initial Vitals: /84 (BP Location: Right arm, Patient Position: Sitting, Cuff Size: Adult Regular)  Pulse 104  Temp 98.3  F (36.8  C) (Oral)  Resp 16  Ht 1.74 m (5' 8.5\")  Wt 107.5 kg (236 lb 14.4 oz)  SpO2 97%  BMI 35.5 kg/m2 Estimated body mass index is 35.5 kg/(m^2) as calculated from the following:    Height as of this encounter: 1.74 m (5' 8.5\").    Weight as of this encounter: 107.5 kg (236 lb 14.4 oz). Body surface area is 2.28 meters squared.  No Pain (1) Comment: Data Unavailable   No LMP recorded.  Allergies reviewed: Yes  Medications reviewed: Yes    Medications: Medication refills not needed today.  Pharmacy name entered into [facility]:    [facility] [place], [place] - [address] [facility] [place], [place] - [address] [facility].    Clinical concerns: N/A       Birgit Westbrook CMA              "

## 2018-10-04 NOTE — PROGRESS NOTES
BMT Donor History and Physical    Shannen Guzmán MRN# [id number]   Age: 38 year old YOB: 1980            Assessment and Plan   Donor for father for NAM NK cells.  Father has Multiple Myeloma.  Father is French guzmán.Patient signed consent for donation of NK cells.  She endorses having copy of consent.  Her ability to donate is pending the receipt of her infectious disease testing.     HPI:No current issues.  She received the news today that her father will get chemo prior to proceeding to NAM NK cells. She says no fevers, chills or recent weight loss. Feels well.  Transfusions: No  Hepatitis: No  Currently pregnant or any chance may be pregnant: No  Currently breast feeding: No  Currently using a method of birth control: yes implanted implanon in arm, current partner has had a vasectomy  Number of previous pregnancies: 3, 2 live births  Alcohol use: No, except at [place]  Tobacco use: Yes, amount per week: smokes about 2 packs per week for the last 15 years  Recreational drugs: No  Nonmedical percutaneous drug use: No  Recent immunizations or planning on getting any immunizations: No  Ear or body piercing in the last 12 months: No  New tattoo in recent 12 months:No  History of cancer or blood disease: No  Known risk factors: None- has not traveled out of [place] or the [facility].  Same partner for the last 2 years.         Past Medical History:     Laser surgery for genital HPV, more then 10 years ago  2 vaginal briths         Past Surgical History:    no other surgical history         Social History:   Patient has 2 children, 6 year old and a 15 year old that are healthy.  She lives in [place].  She works for a [employer].  Smokes about 2 packs per week for the last 15 years.  Has a brother who she is not sure about his medical history.         Family History:   Father with Multiple Myeloma and MI.  Grandma with lung cancer.  Paternal grandfather with thyroid cancer.  Brother with substance  "abuse issues.         Immunizations:   Immunizations are up to date         Allergies:   Allergic to bactrim, had rash on trunk         Medications:     Current Outpatient Prescriptions   Medication Sig     etonogestrel (IMPLANON) 68 MG IMPL 1 each (68 mg) by Subdermal route once for 1 dose     No current facility-administered medications for this visit.    Sometimes uses ibuprofen for HA         Review of Systems:   The Review of Systems is negative other than noted in the HPI.  No issues with eyes.  No issues with swallowing or hearing.  Wears glasses.  No cough or sob. No chest pain or palpitations. No abdominal pain. No muscle or joint issues. No rash.  No dizziness.  No recent weight loss, or fevers, chills or sweats. No dysuria.  Still has her period. No history of cancer in herself.         Physical Exam:   Vitals were reviewed  Blood pressure 138/84, pulse 104, temperature 98.3  F (36.8  C), temperature source Oral, resp. rate 16, height 1.74 m (5' 8.5\"), weight 107.5 kg (236 lb 14.4 oz), SpO2 97 %, not currently breastfeeding.      Constitutional:   awake, alert, cooperative, no apparent distress, and appears stated age   Eyes:   Lids and lashes normal, pupils equal, round and reactive to light, extra ocular muscles intact, sclera clear, conjunctiva normal   ENT:   Normocephalic, without obvious abnormality, atraumatic, sinuses nontender on palpation, external ears without lesions, oral pharynx with moist mucous membranes, tonsils without erythema or exudates, gums normal and good dentition.   Neck:   Supple, symmetrical, trachea midline, no adenopathy, thyroid symmetric, not enlarged and no tenderness, skin normal   Hematologic / Lymphatic:   no cervical lymphadenopathy and no supraclavicular lymphadenopathy   Back:   Symmetric, no curvature, spinous processes are non-tender on palpation, paraspinous muscles are non-tender on palpation, no costal vertebral tenderness   Lungs:   No increased work of " breathing, good air exchange, clear to auscultation bilaterally, no crackles or wheezing   Cardiovascular:   Normal apical impulse, regular rate and rhythm, normal S1 and S2, no S3 or S4, and no murmur noted   Abdomen:   No scars, normal bowel sounds, soft, non-distended, non-tender, no masses palpated, no hepatosplenomegally   Chest / Breast:   Not done   Genitounirinary:  not done    Musculoskeletal:   There is no redness, warmth, or swelling of the joints.  Full range of motion noted.  Motor strength is 5 out of 5 all extremities bilaterally.  Tone is normal.   Neurologic:   Awake, alert, oriented to name, place and time.  Cranial nerves II-XII are grossly intact.  Motor is 5 out of 5 bilaterally.  Cerebellar finger to nose, heel to shin intact.  Sensory is intact.  Babinski down going, Romberg negative, and gait is normal.                  Data:   All laboratory data reviewed  Lab Results   Component Value Date    WBC 7.1 10/04/2018    ANEU 4.8 10/04/2018    HGB 13.6 10/04/2018    HCT 41.3 10/04/2018     10/04/2018     06/03/2009    POTASSIUM 4.1 06/03/2009    CHLORIDE 107 06/03/2009    CO2 25 06/03/2009    GLC 93 04/12/2011    BUN 12 06/03/2009    CR 0.77 10/04/2018       No results found for: AST  Lab Results   Component Value Date    ALT 19 10/04/2018              Aruna Sheppard PA-C

## 2018-10-05 ENCOUNTER — CARE COORDINATION (OUTPATIENT)
Dept: TRANSPLANT | Facility: CLINIC | Age: 38
End: 2018-10-05

## 2018-10-05 LAB
CMV IGG SERPL QL IA: >8 AI (ref 0–0.8)
COPATH REPORT: NORMAL
EBV VCA IGG SER QL IA: 6.6 AI (ref 0–0.8)
HSV1 IGG SERPL QL IA: <0.2 AI (ref 0–0.8)
HSV2 IGG SERPL QL IA: 7.8 AI (ref 0–0.8)

## 2018-10-05 NOTE — PROGRESS NOTES
Discussed unstimulated apheresis procedure for NK donation. Reviewed collection calender and side effects associated with apheresis. Patient verbalized understanding. All questions were answered.

## 2018-10-05 NOTE — PROGRESS NOTES
Left voicemail for Shannen this AM to re-confirm that previously scheduled apheresis date of 10/8 has been canceled.  Apheresis will be postponed to a later date (potentially 10/29).  Shannen instructed to call writer back to confirm receipt of message.

## 2018-10-08 LAB
DONOR CYTOMEGALOVIRUS ABY: POSITIVE
DONOR HEP B CORE ABY: NONREACTIVE
DONOR HEP B SURF AGN: NONREACTIVE
DONOR HEPATITIS C ABY: NONREACTIVE
DONOR HTLV 1&2 ANTIBODY: NONREACTIVE
DONOR TREPONEMA PAL ABY: NONREACTIVE
HIV1+2 AB SERPL QL IA: NONREACTIVE
MPX SERIES: NONREACTIVE
T CRUZI AB SER DONR QL: NONREACTIVE
WNV RNA SPEC QL NAA+PROBE: NONREACTIVE

## 2018-10-29 ENCOUNTER — HOSPITAL ENCOUNTER (OUTPATIENT)
Dept: LAB | Facility: CLINIC | Age: 38
Discharge: HOME | End: 2018-10-29
Attending: INTERNAL MEDICINE | Admitting: INTERNAL MEDICINE

## 2018-10-29 VITALS
BODY MASS INDEX: 35.44 KG/M2 | SYSTOLIC BLOOD PRESSURE: 99 MMHG | HEART RATE: 82 BPM | TEMPERATURE: 98.6 F | RESPIRATION RATE: 16 BRPM | DIASTOLIC BLOOD PRESSURE: 65 MMHG | WEIGHT: 236.55 LBS

## 2018-10-29 PROCEDURE — G0463 HOSPITAL OUTPT CLINIC VISIT: HCPCS | Mod: ZF

## 2018-10-29 NOTE — CONSULTS
APHERESIS INITIAL CONSULT CHECKLIST    Current Encounter Information  Current Encounter Information: Reason for Visit, Allergies and Current Meds  Procedure Requested: MNC/PBSC Collection  History of: (Reason for Apheresis): donor    Access Assessment  Access Assessment  Vein Assessment:  Veins are adequate: Yes (veins adequate LR/LM)  Needs a catheter placed for Apheresis?: No    Vital Signs  Vital Signs  BP: 99/65  Pulse: 82  Temp: 98.6  F (37  C)  Temp src: Oral  Resp: 16  Weight: 107.3 kg (236 lb 8.9 oz)    Reviewed   Review With Patient  Have you read the brochure Getting ready for Apheresis?: Yes  Have you had any invasive procedures, surgery, biopsy, bleeding in the last month?: No  Review medications and allergies: Yes  Have you ever been transfused?: No  Patient given tour of the unit: Yes    Additional Information  Notes, needs and time spent with patient  Explain procedure, side effects or reactions, instructions: Yes  Patient has special need?: No  Time spent: 20 min face to face time spent assessing patient. Reviewed the need to follow a low fat diet from the evening before collection until collection is complete. Dee Ribeiro RN

## 2019-08-30 ENCOUNTER — TELEPHONE (OUTPATIENT)
Dept: OBGYN | Facility: CLINIC | Age: 39
End: 2019-08-30

## 2019-08-30 NOTE — TELEPHONE ENCOUNTER
Pt is past due for Pap follow up  Reminder letter has been sent  LMTC her clinic with any questions or to schedule    Sherry Russo,   Pap Tracking

## 2019-09-30 ENCOUNTER — HEALTH MAINTENANCE LETTER (OUTPATIENT)
Age: 39
End: 2019-09-30

## 2019-10-28 ENCOUNTER — HEALTH MAINTENANCE LETTER (OUTPATIENT)
Age: 39
End: 2019-10-28

## 2019-12-12 ENCOUNTER — OFFICE VISIT (OUTPATIENT)
Dept: OBGYN | Facility: CLINIC | Age: 39
End: 2019-12-12
Payer: COMMERCIAL

## 2019-12-12 VITALS
WEIGHT: 221 LBS | BODY MASS INDEX: 32.73 KG/M2 | SYSTOLIC BLOOD PRESSURE: 113 MMHG | DIASTOLIC BLOOD PRESSURE: 74 MMHG | HEART RATE: 98 BPM | HEIGHT: 69 IN

## 2019-12-12 DIAGNOSIS — Z01.411 ENCOUNTER FOR GYNECOLOGICAL EXAMINATION WITH ABNORMAL FINDING: Primary | ICD-10-CM

## 2019-12-12 DIAGNOSIS — L02.229 BOIL OF TRUNK: ICD-10-CM

## 2019-12-12 DIAGNOSIS — Z12.4 SCREENING FOR MALIGNANT NEOPLASM OF CERVIX: ICD-10-CM

## 2019-12-12 PROCEDURE — 99395 PREV VISIT EST AGE 18-39: CPT | Performed by: OBSTETRICS & GYNECOLOGY

## 2019-12-12 PROCEDURE — G0145 SCR C/V CYTO,THINLAYER,RESCR: HCPCS | Performed by: OBSTETRICS & GYNECOLOGY

## 2019-12-12 PROCEDURE — G0476 HPV COMBO ASSAY CA SCREEN: HCPCS | Performed by: OBSTETRICS & GYNECOLOGY

## 2019-12-12 ASSESSMENT — PATIENT HEALTH QUESTIONNAIRE - PHQ9
SUM OF ALL RESPONSES TO PHQ QUESTIONS 1-9: 0
5. POOR APPETITE OR OVEREATING: NOT AT ALL

## 2019-12-12 ASSESSMENT — MIFFLIN-ST. JEOR: SCORE: 1740.51

## 2019-12-12 ASSESSMENT — ANXIETY QUESTIONNAIRES
3. WORRYING TOO MUCH ABOUT DIFFERENT THINGS: SEVERAL DAYS
1. FEELING NERVOUS, ANXIOUS, OR ON EDGE: NOT AT ALL
6. BECOMING EASILY ANNOYED OR IRRITABLE: SEVERAL DAYS
7. FEELING AFRAID AS IF SOMETHING AWFUL MIGHT HAPPEN: NOT AT ALL
IF YOU CHECKED OFF ANY PROBLEMS ON THIS QUESTIONNAIRE, HOW DIFFICULT HAVE THESE PROBLEMS MADE IT FOR YOU TO DO YOUR WORK, TAKE CARE OF THINGS AT HOME, OR GET ALONG WITH OTHER PEOPLE: SOMEWHAT DIFFICULT
5. BEING SO RESTLESS THAT IT IS HARD TO SIT STILL: NOT AT ALL
2. NOT BEING ABLE TO STOP OR CONTROL WORRYING: SEVERAL DAYS
GAD7 TOTAL SCORE: 3
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
## 2019-12-12 NOTE — NURSING NOTE
"Chief Complaint   Patient presents with     Physical       Initial /74   Pulse 98   Ht 1.742 m (5' 8.6\")   Wt 100.2 kg (221 lb)   LMP 2019 (Approximate)   Breastfeeding No   BMI 33.02 kg/m   Estimated body mass index is 33.02 kg/m  as calculated from the following:    Height as of this encounter: 1.742 m (5' 8.6\").    Weight as of this encounter: 100.2 kg (221 lb).  BP completed using cuff size: large    Questioned patient about current smoking habits.  Pt. currently smokes.  Advised about smoking cessation.          The following HM Due: pap smear and tdaap      The following patient reported/Care Every where data was sent to:  P ABSTRACT QUALITY INITIATIVES [[id number]]  n/a      patient has appointment for today and orders have been placed              "

## 2019-12-13 ASSESSMENT — ANXIETY QUESTIONNAIRES: GAD7 TOTAL SCORE: 3

## 2019-12-17 LAB
COPATH REPORT: NORMAL
PAP: NORMAL

## 2019-12-18 LAB
FINAL DIAGNOSIS: NORMAL
HPV HR 12 DNA CVX QL NAA+PROBE: NEGATIVE
HPV16 DNA SPEC QL NAA+PROBE: NEGATIVE
HPV18 DNA SPEC QL NAA+PROBE: NEGATIVE
SPECIMEN DESCRIPTION: NORMAL
SPECIMEN SOURCE CVX/VAG CYTO: NORMAL

## 2020-01-06 NOTE — CONSULTS
Transfusion Medicine Consultation    Shannen Guzmán [id number]   YOB: 1980 Age: 38 year old   Date of Consult: 10/29/2018     Reason for consult: Allogeneic NK Cell Collection           Assessment and Plan:   38 year old female presents for consultation for allogeneic NK cell collection.  The plan is to collect for 1 to 3 days or until the target goal is met.   The patient does have adequate veins and will not require line placement.          Chief Complaint:   Transfusion medicine consultation.         History of Present Illness:   38 year old female presents for consultation for allogeneic NK cell collection for infusion into her father.  She denies any significant past medical history.  She is currently well.  The patient denies any back pain that would prevent her from tolerating the procedure.  The procedure, risks/benefits were discussed with the patient and all of her questions were addressed at this time.             Past Medical History:   Denies any significant past medical history          Past Surgical History:   Denies any significant past surgical history           Social History:     Social History   Substance Use Topics     Smoking status: Current Every Day Smoker     Packs/day: 0.25     Years: 5.00     Types: Cigarettes     Smokeless tobacco: Never Used     Alcohol use No             Family History:   Father - Multiple Myeloma         Allergies:     Allergies   Allergen Reactions     Bactrim [Sulfamethoxazole W/Trimethoprim] Rash             Medications:     Current Outpatient Prescriptions   Medication Sig     etonogestrel (IMPLANON) 68 MG IMPL 1 each (68 mg) by Subdermal route once for 1 dose     No current facility-administered medications for this encounter.              Review of Systems:   ENT/MOUTH: NEGATIVE for ear, mouth and throat problems  RESP: NEGATIVE for significant cough or SOB  CV: NEGATIVE for chest pain, palpitations or peripheral edema  GI: NEGATIVE for  Labs and pre auth obtained for Coronary CTA. All documentation faxed to Kasie Sotelo at [facility] radiology. Order with pre auth faxed to [facility] pt access. nausea, abdominal pain, heartburn, or change in bowel habits  MUSCULOSKELETAL: NEGATIVE for significant arthralgias or myalgia           Vital Signs:   BP (P) 99/65  Pulse (P) 82  Temp (P) 98.6  F (37  C) (Oral)  Resp (P) 16            Data:     CBC RESULTS:   Recent Labs   Lab Test  10/04/18   1330   WBC  7.1   RBC  4.78   HGB  13.6   HCT  41.3   MCV  86   MCH  28.5   MCHC  32.9   RDW  13.5   PLT  200       John Paul Mas DO  Blood Bank and Transfusion Medicine Fellow  Transfusion Medicine Service  Pager [phone]      ATTESTATION:  I have discussed and reviewed this case with Dr. Mas. I agree with the assessment and plan in this note.  Yuri Park MD, PhD.   Will review imaging

## 2020-07-24 ENCOUNTER — HOSPITAL ENCOUNTER (EMERGENCY)
Facility: CLINIC | Age: 40
Discharge: LEFT BEFORE DISCHARGE | End: 2020-07-24
Payer: COMMERCIAL

## 2020-11-19 ENCOUNTER — OFFICE VISIT (OUTPATIENT)
Dept: OBGYN | Facility: CLINIC | Age: 40
End: 2020-11-19
Payer: COMMERCIAL

## 2020-11-19 VITALS
TEMPERATURE: 98.4 F | DIASTOLIC BLOOD PRESSURE: 65 MMHG | HEIGHT: 69 IN | WEIGHT: 224.4 LBS | SYSTOLIC BLOOD PRESSURE: 112 MMHG | BODY MASS INDEX: 33.24 KG/M2 | HEART RATE: 83 BPM

## 2020-11-19 DIAGNOSIS — Z30.46 NEXPLANON REMOVAL: ICD-10-CM

## 2020-11-19 DIAGNOSIS — L73.1 INGROWN HAIR: ICD-10-CM

## 2020-11-19 PROCEDURE — 99212 OFFICE O/P EST SF 10 MIN: CPT | Mod: 25 | Performed by: OBSTETRICS & GYNECOLOGY

## 2020-11-19 PROCEDURE — 11982 REMOVE DRUG IMPLANT DEVICE: CPT | Performed by: OBSTETRICS & GYNECOLOGY

## 2020-11-19 SDOH — HEALTH STABILITY: MENTAL HEALTH: HOW OFTEN DO YOU HAVE 6 OR MORE DRINKS ON ONE OCCASION?: NOT ASKED

## 2020-11-19 SDOH — HEALTH STABILITY: MENTAL HEALTH: HOW MANY STANDARD DRINKS CONTAINING ALCOHOL DO YOU HAVE ON A TYPICAL DAY?: NOT ASKED

## 2020-11-19 SDOH — HEALTH STABILITY: MENTAL HEALTH: HOW OFTEN DO YOU HAVE A DRINK CONTAINING ALCOHOL?: NOT ASKED

## 2020-11-19 ASSESSMENT — MIFFLIN-ST. JEOR: SCORE: 1761.21

## 2020-11-19 NOTE — PROGRESS NOTES
"S:  Shannen presents for   1. Nexplanon removal  Placed 2018  Having prolonged bleeding, three weeks at a time.   Rarely sexually active, when she is sexually active partner has a vasectomy.    2. Lump in armpit  Right side  Present x 2 weeks.   Was worse and has gotten smaller  No drainage.    O:  Vitals:    20 0919   BP: 112/65   BP Location: Right arm   Patient Position: Sitting   Cuff Size: Adult Large   Pulse: 83   Temp: 98.4  F (36.9  C)   TempSrc: Oral   Weight: 101.8 kg (224 lb 6.4 oz)   Height: 1.759 m (5' 9.25\")     Gen: well appearing  Skin: 1 x 2 cm swelling in right axilla. Firm, mildly tender, very superficial, no erythema.   MS: nexplanon palpable in right arm    Procedure: Nexplanon removal  PARQ was held and consents signed. Patient was placed in dorsal supine position with rright arm abducted and externally rotated. Nexplanon was palpated under skin. The area was cleansed with betadine. The distal site was injected with 1 ml of 1% plain lidocaine.  While pushing down on the proximal end, 2 mm incision was made over the distal implant with an 11 blade scalpel. The implant was grasped with a mosquito forceps and removed intact. The skin was closed with dermabond. A pressure bandage was placed for the next 6 hours. The patient tolerated the procedure well.       A/P:  40 year old  with   (Z30.46) Nexplanon removal  Comment:   Plan: REMOVAL NEXPLANON        Declines other contraception  Removed without difficulty.    (L73.1) Ingrown hair  Comment:   Plan:   Reviewed precautions  Reviewed cares: warm compresses, gentle scrubbing.  Does not appear to need antibiotics currently.   Call clinic if it worsens or does not resolve.      "

## 2020-11-19 NOTE — PROGRESS NOTES
"Chief Complaint   Patient presents with     IUD     RECHECK       Initial /65 (BP Location: Right arm, Patient Position: Sitting, Cuff Size: Adult Large)   Pulse 83   Temp 98.4  F (36.9  C) (Oral)   Ht 1.759 m (5' 9.25\")   Wt 101.8 kg (224 lb 6.4 oz)   LMP 2020   Breastfeeding No   BMI 32.90 kg/m   Estimated body mass index is 32.9 kg/m  as calculated from the following:    Height as of this encounter: 1.759 m (5' 9.25\").    Weight as of this encounter: 101.8 kg (224 lb 6.4 oz).  BP completed using cuff size: large    Questioned patient about current smoking habits.  Pt. currently smokes.  Advised about smoking cessation.          The following HM Due: NONE      The following patient reported/Care Every where data was sent to:  P ABSTRACT QUALITY INITIATIVES [[id number]]  n/a      n/a and patient has appointment for today                "

## 2021-01-17 ENCOUNTER — HEALTH MAINTENANCE LETTER (OUTPATIENT)
Age: 41
End: 2021-01-17

## 2021-10-24 ENCOUNTER — HEALTH MAINTENANCE LETTER (OUTPATIENT)
Age: 41
End: 2021-10-24

## 2022-02-13 ENCOUNTER — HEALTH MAINTENANCE LETTER (OUTPATIENT)
Age: 42
End: 2022-02-13

## 2022-09-01 ASSESSMENT — ENCOUNTER SYMPTOMS: BREAST MASS: 0

## 2022-09-07 ASSESSMENT — ENCOUNTER SYMPTOMS: BREAST MASS: 0

## 2022-09-07 NOTE — PROGRESS NOTES
SUBJECTIVE:   CC: Shannen Guzmán is an 42 year old woman who presents for preventive health visit.       Patient has been advised of split billing requirements and indicates understanding: Yes  Healthy Habits:     Getting at least 3 servings of Calcium per day:  NO    Bi-annual eye exam:  Yes    Dental care twice a year:  Yes    Sleep apnea or symptoms of sleep apnea:  None    Diet:  Regular (no restrictions)    Frequency of exercise:  2-3 days/week    Duration of exercise:  15-30 minutes    Taking medications regularly:  Yes    Medication side effects:  Not applicable    PHQ-2 Total Score: 1    Additional concerns today:  No    Concerns -   Patient would like pregnancy test. States she's pretty sure she is not but wanted to make sure. LMP 8/29. Implanon removed 2-3 years, periods regular but light and only 3 days. Last intercourse 8/13. No nausea, fatigue, breast changes.   Declines Flu vaccine.    Changes to health - reduced smoking.         Today's PHQ-2 Score:   PHQ-2 ( 1999 Pfizer) 9/1/2022   Q1: Little interest or pleasure in doing things 0   Q2: Feeling down, depressed or hopeless 1   PHQ-2 Score 1   Q1: Little interest or pleasure in doing things Not at all   Q2: Feeling down, depressed or hopeless Several days   PHQ-2 Score 1       Abuse: Current or Past (Physical, Sexual or Emotional) - No  Do you feel safe in your environment? Yes    Have you ever done Advance Care Planning? (For example, a Health Directive, POLST, or a discussion with a medical provider or your loved ones about your wishes): No, advance care planning information given to patient to review.  Patient declined advance care planning discussion at this time.    Social History     Tobacco Use     Smoking status: Current Every Day Smoker     Packs/day: 0.25     Years: 10.00     Pack years: 2.50     Types: Cigarettes     Smokeless tobacco: Never Used   Substance Use Topics     Alcohol use: No     Comment: rare         Alcohol Use 9/1/2022    Prescreen: >3 drinks/day or >7 drinks/week? Not Applicable   Prescreen: >3 drinks/day or >7 drinks/week? -       Reviewed orders with patient.  Reviewed health maintenance and updated orders accordingly - Yes  Lab work is in process  Labs reviewed in EPIC  BP Readings from Last 3 Encounters:   09/08/22 114/68   11/19/20 112/65   12/12/19 113/74    Wt Readings from Last 3 Encounters:   09/08/22 102.5 kg (226 lb)   11/19/20 101.8 kg (224 lb 6.4 oz)   12/12/19 100.2 kg (221 lb)            Patient Active Problem List   Diagnosis     Other acne     Human papillomavirus in conditions classified elsewhere and of unspecified site     Headache     Cervical high risk HPV (human papillomavirus) test positive     Hidradenitis suppurativa     Rosacea     CARDIOVASCULAR SCREENING; LDL GOAL LESS THAN 160     TMJ (temporomandibular joint syndrome)     Nexplanon insertion     Stem cell donor     Personal history of diseases of blood and blood-forming organs     Past Surgical History:   Procedure Laterality Date     HC LASER SURGERY OF CERVIX      laser to genital area, ? abnl paps       Social History     Tobacco Use     Smoking status: Current Every Day Smoker     Packs/day: 0.25     Years: 10.00     Pack years: 2.50     Types: Cigarettes     Smokeless tobacco: Never Used   Substance Use Topics     Alcohol use: No     Comment: rare     Family History   Problem Relation Age of Onset     Cancer Maternal Grandmother 74        01/2019     Cancer Paternal Grandmother      Cancer Paternal Grandfather      Other Cancer Father      C.A.D. No family hx of      Diabetes No family hx of      Hypertension No family hx of      Cerebrovascular Disease No family hx of      Breast Cancer No family hx of      Cancer - colorectal No family hx of      Prostate Cancer No family hx of          Current Outpatient Medications   Medication Sig Dispense Refill     Ibuprofen (ADVIL PO)        Allergies   Allergen Reactions     Bactrim [Sulfamethoxazole  W/Trimethoprim] Rash     Recent Labs   Lab Test 09/08/22  1620 10/04/18  1330 11/06/15  0902   A1C 5.7*  --   --    *  --   --    HDL 50  --   --    TRIG 128  --   --    ALT  --  19  --    CR  --  0.77  --    GFRESTIMATED  --  84  --    GFRESTBLACK  --  >90  --    TSH  --   --  2.43        Breast Cancer Screening:    Breast CA Risk Assessment (FHS-7) 9/1/2022   Do you have a family history of breast, colon, or ovarian cancer? No / Unknown         Mammogram Screening - Offered annual screening and updated Health Maintenance for [place] plan based on risk factor consideration  LAST MAMMOGRAM 1/25/22 at [facility] - viewed on Care Everywhere - showed no concerning findings, rec'd annual screening.   Pertinent mammograms are reviewed under the imaging tab.    History of abnormal Pap smear:   Last 3 Pap Results:   PAP (no units)   Date Value   12/12/2019 NIL   05/03/2018 NIL   09/02/2016 NIL     PAP / HPV Latest Ref Rng & Units 12/12/2019 5/3/2018 9/2/2016   PAP (Historical) - NIL NIL NIL   HPV16 NEG:Negative Negative Negative Negative   HPV18 NEG:Negative Negative Negative Negative   HRHPV NEG:Negative Negative Positive(A) Positive(A)     Reviewed and updated as needed this visit by clinical staff   Tobacco   Meds   Med Hx  Surg Hx  Fam Hx  Soc Hx        Reviewed and updated as needed this visit by Provider                   Past Medical History:   Diagnosis Date     Acne      ASCUS on Pap smear 11/19/2007    colposcopy 1/14/08- WNL. paps NIL since     Headache(784.0)      Hidradenitides, suppurative      Human papillomavirus in conditions classified elsewhere and of unspecified site      NONSPECIFIC MEDICAL HISTORY     vaginal delivery 2003, RH neg     Other acne       Past Surgical History:   Procedure Laterality Date     HC LASER SURGERY OF CERVIX      laser to genital area, ? abnl paps       Current Outpatient Medications   Medication     Ibuprofen (ADVIL PO)     No current  "facility-administered medications for this visit.       Review of Systems   Breasts:  Negative for tenderness, breast mass and discharge.   Genitourinary: Negative for pelvic pain, vaginal bleeding and vaginal discharge.      OBJECTIVE:   /68   Pulse 72   Temp 98  F (36.7  C) (Oral)   Resp 16   Ht 1.759 m (5' 9.25\")   Wt 102.5 kg (226 lb)   LMP 08/29/2022   SpO2 100%   BMI 33.13 kg/m    Physical Exam  GENERAL: healthy, alert and no distress  EYES: Eyes grossly normal to inspection, PERRL and conjunctivae and sclerae normal  HENT: ear canals and TM's normal, nose and mouth without ulcers or lesions  NECK: no adenopathy, no asymmetry, masses, or scars and thyroid normal to palpation  RESP: lungs clear to auscultation - no rales, rhonchi or wheezes  BREAST: normal without masses, tenderness or nipple discharge and no palpable axillary masses or adenopathy  CV: regular rate and rhythm, normal S1 S2, no S3 or S4, no murmur, click or rub, no peripheral edema and peripheral pulses strong  ABDOMEN: soft, nontender, no hepatosplenomegaly, no masses and bowel sounds normal   (female): normal female external genitalia, normal urethral meatus, vaginal mucosa pink, moist, well rugated, and normal cervix/adnexa/uterus without masses or discharge  MS: no gross musculoskeletal defects noted, no edema  SKIN: no suspicious lesions or rashes  NEURO: Normal strength and tone, mentation intact and speech normal  PSYCH: mentation appears normal, affect normal/bright    Diagnostic Test Results:  Labs reviewed in Epic  Results for orders placed or performed in visit on 09/08/22   Lipid panel reflex to direct LDL Fasting     Status: Abnormal   Result Value Ref Range    Cholesterol 191 <200 mg/dL    Triglycerides 128 <150 mg/dL    Direct Measure HDL 50 >=50 mg/dL    LDL Cholesterol Calculated 115 (H) <=100 mg/dL    Non HDL Cholesterol 141 (H) <130 mg/dL    Patient Fasting > 8hrs? No     Narrative    Cholesterol  Desirable:  " <200 mg/dL    Triglycerides  Normal:  Less than 150 mg/dL  Borderline High:  150-199 mg/dL  High:  200-499 mg/dL  Very High:  Greater than or equal to 500 mg/dL    Direct Measure HDL  Female:  Greater than or equal to 50 mg/dL   Male:  Greater than or equal to 40 mg/dL    LDL Cholesterol  Desirable:  <100mg/dL  Above Desirable:  100-129 mg/dL   Borderline High:  130-159 mg/dL   High:  160-189 mg/dL   Very High:  >= 190 mg/dL    Non HDL Cholesterol  Desirable:  130 mg/dL  Above Desirable:  130-159 mg/dL  Borderline High:  160-189 mg/dL  High:  190-219 mg/dL  Very High:  Greater than or equal to 220 mg/dL   Hemoglobin A1c     Status: Abnormal   Result Value Ref Range    Hemoglobin A1C 5.7 (H) 0.0 - 5.6 %   HCG Qual, Urine ([id number])     Status: Normal   Result Value Ref Range    hCG Urine Qualitative Negative Negative       ASSESSMENT/PLAN:   Shannen was seen today for physical. Generally healthy, normal physical exam. Routine labs today, screening for HLD, diabetes given BMI > 33.    Diagnoses and all orders for this visit:    Routine general medical examination at a health care facility    Cervical cancer screening  -     Pap Screen with HPV - recommended age 30 - 65 years    Screening for hyperlipidemia  -     Lipid panel reflex to direct LDL Fasting    Screening for diabetes mellitus  -     Hemoglobin A1c    Pregnancy examination or test, pregnancy unconfirmed  -     HCG Qual, Urine ([id number])    Need for vaccination  -     REVIEW OF HEALTH MAINTENANCE PROTOCOL ORDERS  -     Pneumococcal 20 Valent Conjugate (Prevnar 20)  -     HEPATITIS B VACCINE ADULT 3 DOSE IM (ENGERIX-B/RECOMBIVAX HB)  -     INFLUENZA VACCINE IM >6 MO VALENT IIV4 (ALFURIA/FLUZONE)    Class 1 obesity with serious comorbidity and body mass index (BMI) of 33.0 to 33.9 in adult, unspecified obesity type  BMI 33.13 today. Comorbidity - current smoker, HLD, pre-diabetes.  Suggest follow up before next routine annual exam (at least 6 months to recheck  "A1c, lipids, discuss weight loss).          COUNSELING:  Reviewed preventive health counseling, as reflected in patient instructions    Estimated body mass index is 33.13 kg/m  as calculated from the following:    Height as of this encounter: 1.759 m (5' 9.25\").    Weight as of this encounter: 102.5 kg (226 lb).    Weight management plan: Discussed healthy diet and exercise guidelines    She reports that she has been smoking cigarettes. She has a 2.50 pack-year smoking history. She has never used smokeless tobacco.  Nicotine/Tobacco Cessation Plan:   Self help information given to patient      Counseling Resources:  ATP IV Guidelines  Pooled Cohorts Equation Calculator  Breast Cancer Risk Calculator  BRCA-Related Cancer Risk Assessment: FHS-7 Tool  FRAX Risk Assessment  ICSI Preventive Guidelines  Dietary Guidelines for Americans, 2010  USDA's MyPlate  ASA Prophylaxis  Lung CA Screening    [facility]  "

## 2022-09-07 NOTE — PATIENT INSTRUCTIONS
Pap completed today, if normal repeat in 3-5 years pending results.     Pregnancy test today          Patient Education     Preventive Health Recommendations  Female Ages 40 to 49    Yearly exam:   See your health care provider every year in order to  Review health changes.   Discuss preventive care.    Review your medicines if your doctor prescribed any.    Get a Pap test every three years (unless you have an abnormal result and your provider advises testing more often).    If you get Pap tests with HPV test, you only need to test every 5 years, unless you have an abnormal result. You do not need a Pap test if your uterus was removed (hysterectomy) and you have not had cancer.    You should be tested each year for STDs (sexually transmitted diseases), if you're at risk.   Ask your doctor if you should have a mammogram.    Have a colonoscopy (test for colon cancer) if someone in your family has had colon cancer or polyps before age 50.     Have a cholesterol test every 5 years.     Have a diabetes test (fasting glucose) after age 45. If you are at risk for diabetes, you should have this test every 3 years.    Shots: Get a flu shot each year. Get a tetanus shot every 10 years.     Nutrition:   Eat at least 5 servings of fruits and vegetables each day.  Eat whole-grain bread, whole-wheat pasta and brown rice instead of white grains and rice.  Get adequate Calcium and Vitamin D.      Lifestyle  Exercise at least 150 minutes a week (an average of 30 minutes a day, 5 days a week). This will help you control your weight and prevent disease.  Limit alcohol to one drink per day.  No smoking.   Wear sunscreen to prevent skin cancer.  See your dentist every six months for an exam and cleaning.

## 2022-09-08 ENCOUNTER — OFFICE VISIT (OUTPATIENT)
Dept: FAMILY MEDICINE | Facility: CLINIC | Age: 42
End: 2022-09-08
Payer: COMMERCIAL

## 2022-09-08 VITALS
HEART RATE: 72 BPM | BODY MASS INDEX: 33.47 KG/M2 | RESPIRATION RATE: 16 BRPM | SYSTOLIC BLOOD PRESSURE: 114 MMHG | DIASTOLIC BLOOD PRESSURE: 68 MMHG | TEMPERATURE: 98 F | OXYGEN SATURATION: 100 % | HEIGHT: 69 IN | WEIGHT: 226 LBS

## 2022-09-08 DIAGNOSIS — Z12.4 CERVICAL CANCER SCREENING: ICD-10-CM

## 2022-09-08 DIAGNOSIS — Z13.220 SCREENING FOR HYPERLIPIDEMIA: ICD-10-CM

## 2022-09-08 DIAGNOSIS — E66.811 CLASS 1 OBESITY WITH SERIOUS COMORBIDITY AND BODY MASS INDEX (BMI) OF 33.0 TO 33.9 IN ADULT, UNSPECIFIED OBESITY TYPE: ICD-10-CM

## 2022-09-08 DIAGNOSIS — Z00.00 ROUTINE GENERAL MEDICAL EXAMINATION AT A HEALTH CARE FACILITY: Primary | ICD-10-CM

## 2022-09-08 DIAGNOSIS — Z23 NEED FOR VACCINATION: ICD-10-CM

## 2022-09-08 DIAGNOSIS — Z32.00 PREGNANCY EXAMINATION OR TEST, PREGNANCY UNCONFIRMED: ICD-10-CM

## 2022-09-08 DIAGNOSIS — Z13.1 SCREENING FOR DIABETES MELLITUS: ICD-10-CM

## 2022-09-08 LAB
HBA1C MFR BLD: 5.7 % (ref 0–5.6)
HCG UR QL: NEGATIVE

## 2022-09-08 PROCEDURE — 90686 IIV4 VACC NO PRSV 0.5 ML IM: CPT

## 2022-09-08 PROCEDURE — 99386 PREV VISIT NEW AGE 40-64: CPT | Mod: 25

## 2022-09-08 PROCEDURE — 90677 PCV20 VACCINE IM: CPT

## 2022-09-08 PROCEDURE — 80061 LIPID PANEL: CPT

## 2022-09-08 PROCEDURE — 36415 COLL VENOUS BLD VENIPUNCTURE: CPT

## 2022-09-08 PROCEDURE — 90472 IMMUNIZATION ADMIN EACH ADD: CPT

## 2022-09-08 PROCEDURE — 90471 IMMUNIZATION ADMIN: CPT

## 2022-09-08 PROCEDURE — 83036 HEMOGLOBIN GLYCOSYLATED A1C: CPT

## 2022-09-08 PROCEDURE — 90746 HEPB VACCINE 3 DOSE ADULT IM: CPT

## 2022-09-08 PROCEDURE — 81025 URINE PREGNANCY TEST: CPT

## 2022-09-08 PROCEDURE — G0145 SCR C/V CYTO,THINLAYER,RESCR: HCPCS

## 2022-09-08 PROCEDURE — 87624 HPV HI-RISK TYP POOLED RSLT: CPT

## 2022-09-08 ASSESSMENT — PAIN SCALES - GENERAL: PAINLEVEL: NO PAIN (0)

## 2022-09-09 LAB
CHOLEST SERPL-MCNC: 191 MG/DL
FASTING STATUS PATIENT QL REPORTED: NO
HDLC SERPL-MCNC: 50 MG/DL
LDLC SERPL CALC-MCNC: 115 MG/DL
NONHDLC SERPL-MCNC: 141 MG/DL
TRIGL SERPL-MCNC: 128 MG/DL

## 2022-09-13 LAB
BKR LAB AP GYN ADEQUACY: NORMAL
BKR LAB AP GYN INTERPRETATION: NORMAL
BKR LAB AP HPV REFLEX: NORMAL
BKR LAB AP LMP: NORMAL
BKR LAB AP PREVIOUS ABNL DX: NORMAL
BKR LAB AP PREVIOUS ABNORMAL: NORMAL
PATH REPORT.COMMENTS IMP SPEC: NORMAL
PATH REPORT.COMMENTS IMP SPEC: NORMAL
PATH REPORT.RELEVANT HX SPEC: NORMAL

## 2022-09-15 ENCOUNTER — PATIENT OUTREACH (OUTPATIENT)
Dept: FAMILY MEDICINE | Facility: CLINIC | Age: 42
End: 2022-09-15

## 2022-09-15 LAB
HUMAN PAPILLOMA VIRUS 16 DNA: NEGATIVE
HUMAN PAPILLOMA VIRUS 18 DNA: NEGATIVE
HUMAN PAPILLOMA VIRUS FINAL DIAGNOSIS: ABNORMAL
HUMAN PAPILLOMA VIRUS OTHER HR: POSITIVE

## 2023-08-09 ENCOUNTER — PATIENT OUTREACH (OUTPATIENT)
Dept: CARE COORDINATION | Facility: CLINIC | Age: 43
End: 2023-08-09
Payer: COMMERCIAL

## 2023-08-23 ENCOUNTER — PATIENT OUTREACH (OUTPATIENT)
Dept: CARE COORDINATION | Facility: CLINIC | Age: 43
End: 2023-08-23
Payer: COMMERCIAL

## 2023-08-24 ENCOUNTER — PATIENT OUTREACH (OUTPATIENT)
Dept: FAMILY MEDICINE | Facility: CLINIC | Age: 43
End: 2023-08-24
Payer: COMMERCIAL

## 2023-10-02 ASSESSMENT — ENCOUNTER SYMPTOMS
WEAKNESS: 0
PALPITATIONS: 0
DYSURIA: 0
HEMATURIA: 0
SHORTNESS OF BREATH: 0
CONSTIPATION: 0
JOINT SWELLING: 0
DIZZINESS: 0
PARESTHESIAS: 0
FREQUENCY: 0
NAUSEA: 0
HEARTBURN: 0
DIARRHEA: 0
EYE PAIN: 0
SORE THROAT: 0
BREAST MASS: 0
HEADACHES: 0
MYALGIAS: 0
NERVOUS/ANXIOUS: 0
COUGH: 0
FEVER: 0
ABDOMINAL PAIN: 0
HEMATOCHEZIA: 0
CHILLS: 0
ARTHRALGIAS: 0

## 2023-10-09 ENCOUNTER — OFFICE VISIT (OUTPATIENT)
Dept: OBGYN | Facility: CLINIC | Age: 43
End: 2023-10-09
Payer: COMMERCIAL

## 2023-10-09 VITALS
DIASTOLIC BLOOD PRESSURE: 73 MMHG | BODY MASS INDEX: 31.61 KG/M2 | OXYGEN SATURATION: 98 % | HEIGHT: 68 IN | WEIGHT: 208.6 LBS | HEART RATE: 89 BPM | TEMPERATURE: 97 F | SYSTOLIC BLOOD PRESSURE: 118 MMHG

## 2023-10-09 DIAGNOSIS — Z01.419 ENCOUNTER FOR GYNECOLOGICAL EXAMINATION WITHOUT ABNORMAL FINDING: Primary | ICD-10-CM

## 2023-10-09 DIAGNOSIS — R87.810 CERVICAL HIGH RISK HPV (HUMAN PAPILLOMAVIRUS) TEST POSITIVE: ICD-10-CM

## 2023-10-09 DIAGNOSIS — Z13.1 ENCOUNTER FOR SCREENING EXAMINATION FOR IMPAIRED GLUCOSE REGULATION AND DIABETES MELLITUS: ICD-10-CM

## 2023-10-09 DIAGNOSIS — Z11.3 SCREEN FOR STD (SEXUALLY TRANSMITTED DISEASE): ICD-10-CM

## 2023-10-09 LAB
HBA1C MFR BLD: 5.5 % (ref 0–5.6)
T PALLIDUM AB SER QL: NONREACTIVE

## 2023-10-09 PROCEDURE — 36415 COLL VENOUS BLD VENIPUNCTURE: CPT | Performed by: OBSTETRICS & GYNECOLOGY

## 2023-10-09 PROCEDURE — 87389 HIV-1 AG W/HIV-1&-2 AB AG IA: CPT | Performed by: OBSTETRICS & GYNECOLOGY

## 2023-10-09 PROCEDURE — 83036 HEMOGLOBIN GLYCOSYLATED A1C: CPT | Performed by: OBSTETRICS & GYNECOLOGY

## 2023-10-09 PROCEDURE — 88175 CYTOPATH C/V AUTO FLUID REDO: CPT | Performed by: OBSTETRICS & GYNECOLOGY

## 2023-10-09 PROCEDURE — 86780 TREPONEMA PALLIDUM: CPT | Performed by: OBSTETRICS & GYNECOLOGY

## 2023-10-09 PROCEDURE — 87624 HPV HI-RISK TYP POOLED RSLT: CPT | Performed by: OBSTETRICS & GYNECOLOGY

## 2023-10-09 PROCEDURE — 87491 CHLMYD TRACH DNA AMP PROBE: CPT | Performed by: OBSTETRICS & GYNECOLOGY

## 2023-10-09 PROCEDURE — 87591 N.GONORRHOEAE DNA AMP PROB: CPT | Performed by: OBSTETRICS & GYNECOLOGY

## 2023-10-09 PROCEDURE — 99396 PREV VISIT EST AGE 40-64: CPT | Performed by: OBSTETRICS & GYNECOLOGY
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
## 2023-10-10 LAB
C TRACH DNA SPEC QL PROBE+SIG AMP: NEGATIVE
HIV 1+2 AB+HIV1 P24 AG SERPL QL IA: NONREACTIVE
N GONORRHOEA DNA SPEC QL NAA+PROBE: NEGATIVE

## 2023-10-11 LAB
BKR LAB AP GYN ADEQUACY: NORMAL
BKR LAB AP GYN INTERPRETATION: NORMAL
BKR LAB AP HPV REFLEX: NORMAL
BKR LAB AP LMP: NORMAL
BKR LAB AP PREVIOUS ABNORMAL: NORMAL
PATH REPORT.COMMENTS IMP SPEC: NORMAL
PATH REPORT.COMMENTS IMP SPEC: NORMAL
PATH REPORT.RELEVANT HX SPEC: NORMAL

## 2023-10-14 LAB
HUMAN PAPILLOMA VIRUS 16 DNA: NEGATIVE
HUMAN PAPILLOMA VIRUS 18 DNA: NEGATIVE
HUMAN PAPILLOMA VIRUS FINAL DIAGNOSIS: NORMAL
HUMAN PAPILLOMA VIRUS OTHER HR: NEGATIVE

## 2023-10-16 ENCOUNTER — PATIENT OUTREACH (OUTPATIENT)
Dept: OBGYN | Facility: CLINIC | Age: 43
End: 2023-10-16
Payer: COMMERCIAL

## 2023-10-16 DIAGNOSIS — R87.810 CERVICAL HIGH RISK HPV (HUMAN PAPILLOMAVIRUS) TEST POSITIVE: Primary | ICD-10-CM

## 2024-09-09 ENCOUNTER — PATIENT OUTREACH (OUTPATIENT)
Dept: CARE COORDINATION | Facility: CLINIC | Age: 44
End: 2024-09-09
Payer: COMMERCIAL

## 2024-09-23 ENCOUNTER — PATIENT OUTREACH (OUTPATIENT)
Dept: CARE COORDINATION | Facility: CLINIC | Age: 44
End: 2024-09-23
Payer: COMMERCIAL

## 2024-09-23 ENCOUNTER — PATIENT OUTREACH (OUTPATIENT)
Dept: OBGYN | Facility: CLINIC | Age: 44
End: 2024-09-23
Payer: COMMERCIAL

## 2024-10-17 SDOH — HEALTH STABILITY: PHYSICAL HEALTH: ON AVERAGE, HOW MANY DAYS PER WEEK DO YOU ENGAGE IN MODERATE TO STRENUOUS EXERCISE (LIKE A BRISK WALK)?: 7 DAYS

## 2024-10-17 SDOH — HEALTH STABILITY: PHYSICAL HEALTH: ON AVERAGE, HOW MANY MINUTES DO YOU ENGAGE IN EXERCISE AT THIS LEVEL?: 150+ MIN

## 2024-10-17 ASSESSMENT — SOCIAL DETERMINANTS OF HEALTH (SDOH): HOW OFTEN DO YOU GET TOGETHER WITH FRIENDS OR RELATIVES?: THREE TIMES A WEEK

## 2024-10-22 ENCOUNTER — OFFICE VISIT (OUTPATIENT)
Dept: FAMILY MEDICINE | Facility: CLINIC | Age: 44
End: 2024-10-22
Payer: COMMERCIAL

## 2024-10-22 VITALS
RESPIRATION RATE: 18 BRPM | HEIGHT: 69 IN | WEIGHT: 192.13 LBS | TEMPERATURE: 98.9 F | HEART RATE: 94 BPM | SYSTOLIC BLOOD PRESSURE: 106 MMHG | OXYGEN SATURATION: 97 % | DIASTOLIC BLOOD PRESSURE: 69 MMHG | BODY MASS INDEX: 28.46 KG/M2

## 2024-10-22 DIAGNOSIS — Z00.00 ROUTINE GENERAL MEDICAL EXAMINATION AT A HEALTH CARE FACILITY: Primary | ICD-10-CM

## 2024-10-22 DIAGNOSIS — Z12.4 CERVICAL CANCER SCREENING: ICD-10-CM

## 2024-10-22 DIAGNOSIS — Z12.31 ENCOUNTER FOR SCREENING MAMMOGRAM FOR MALIGNANT NEOPLASM OF BREAST: ICD-10-CM

## 2024-10-22 DIAGNOSIS — Z13.1 SCREENING FOR DIABETES MELLITUS: ICD-10-CM

## 2024-10-22 DIAGNOSIS — F17.200 TOBACCO USE DISORDER: ICD-10-CM

## 2024-10-22 DIAGNOSIS — R87.810 CERVICAL HIGH RISK HPV (HUMAN PAPILLOMAVIRUS) TEST POSITIVE: ICD-10-CM

## 2024-10-22 PROCEDURE — 99396 PREV VISIT EST AGE 40-64: CPT | Mod: 25 | Performed by: PHYSICIAN ASSISTANT

## 2024-10-22 PROCEDURE — 91320 SARSCV2 VAC 30MCG TRS-SUC IM: CPT | Performed by: PHYSICIAN ASSISTANT

## 2024-10-22 PROCEDURE — 87624 HPV HI-RISK TYP POOLED RSLT: CPT | Performed by: PHYSICIAN ASSISTANT

## 2024-10-22 PROCEDURE — G0145 SCR C/V CYTO,THINLAYER,RESCR: HCPCS | Performed by: PHYSICIAN ASSISTANT

## 2024-10-22 PROCEDURE — 90746 HEPB VACCINE 3 DOSE ADULT IM: CPT | Performed by: PHYSICIAN ASSISTANT

## 2024-10-22 PROCEDURE — 90480 ADMN SARSCOV2 VAC 1/ONLY CMP: CPT | Performed by: PHYSICIAN ASSISTANT

## 2024-10-22 PROCEDURE — 90471 IMMUNIZATION ADMIN: CPT | Performed by: PHYSICIAN ASSISTANT

## 2024-10-22 ASSESSMENT — PAIN SCALES - GENERAL: PAINLEVEL: NO PAIN (0)

## 2024-10-22 NOTE — PATIENT INSTRUCTIONS
Patient Education   Preventive Care Advice   This is general advice given by our system to help you stay healthy. However, your care team may have specific advice just for you. Please talk to your care team about your preventive care needs.  Nutrition  Eat 5 or more servings of fruits and vegetables each day.  Try wheat bread, brown rice and whole grain pasta (instead of white bread, rice, and pasta).  Get enough calcium and vitamin D. Check the label on foods and aim for 100% of the RDA (recommended daily allowance).  Lifestyle  Exercise at least 150 minutes each week  (30 minutes a day, 5 days a week).  Do muscle strengthening activities 2 days a week. These help control your weight and prevent disease.  No smoking.  Wear sunscreen to prevent skin cancer.  Have a dental exam and cleaning every 6 months.  Yearly exams  See your health care team every year to talk about:  Any changes in your health.  Any medicines your care team has prescribed.  Preventive care, family planning, and ways to prevent chronic diseases.  Shots (vaccines)   HPV shots (up to age 26), if you've never had them before.  Hepatitis B shots (up to age 59), if you've never had them before.  COVID-19 shot: Get this shot when it's due.  Flu shot: Get a flu shot every year.  Tetanus shot: Get a tetanus shot every 10 years.  Pneumococcal, hepatitis A, and RSV shots: Ask your care team if you need these based on your risk.  Shingles shot (for age 50 and up)  General health tests  Diabetes screening:  Starting at age 35, Get screened for diabetes at least every 3 years.  If you are younger than age 35, ask your care team if you should be screened for diabetes.  Cholesterol test: At age 39, start having a cholesterol test every 5 years, or more often if advised.  Bone density scan (DEXA): At age 50, ask your care team if you should have this scan for osteoporosis (brittle bones).  Hepatitis C: Get tested at least once in your life.  STIs (sexually  transmitted infections)  Before age 24: Ask your care team if you should be screened for STIs.  After age 24: Get screened for STIs if you're at risk. You are at risk for STIs (including HIV) if:  You are sexually active with more than one person.  You don't use condoms every time.  You or a partner was diagnosed with a sexually transmitted infection.  If you are at risk for HIV, ask about PrEP medicine to prevent HIV.  Get tested for HIV at least once in your life, whether you are at risk for HIV or not.  Cancer screening tests  Cervical cancer screening: If you have a cervix, begin getting regular cervical cancer screening tests starting at age 21.  Breast cancer scan (mammogram): If you've ever had breasts, begin having regular mammograms starting at age 40. This is a scan to check for breast cancer.  Colon cancer screening: It is important to start screening for colon cancer at age 45.  Have a colonoscopy test every 10 years (or more often if you're at risk) Or, ask your provider about stool tests like a FIT test every year or Cologuard test every 3 years.  To learn more about your testing options, visit:   .  For help making a decision, visit:   https://bit.ly/rt85549.  Prostate cancer screening test: If you have a prostate, ask your care team if a prostate cancer screening test (PSA) at age 55 is right for you.  Lung cancer screening: If you are a current or former smoker ages 50 to 80, ask your care team if ongoing lung cancer screenings are right for you.  For informational purposes only. Not to replace the advice of your health care provider. Copyright   2023 [facility]. All rights reserved. Clinically reviewed by the [facility]. [employer] 011932 - REV 01/24.  Eating Healthy Foods: Care Instructions  With every meal, you can make healthy food choices. Try to eat a variety of fruits, vegetables, whole grains, lean proteins, and low-fat dairy products. This can help  "you get the right balance of nutrients, including vitamins and minerals. Small changes add up over time. You can start by adding one healthy food to your meals each day.    Try to make half your plate fruits and vegetables, one-fourth whole grains, and one-fourth lean proteins. Try including dairy with your meals.   Eat more fruits and vegetables. Try to have them with most meals and snacks.   Foods for healthy eating        Fruits   These can be fresh, frozen, canned, or dried.  Try to choose whole fruit rather than fruit juice.  Eat a variety of colors.        Vegetables   These can be fresh, frozen, canned, or dried.  Beans, peas, and lentils count too.        Whole grains   Choose whole-grain breads, cereals, and noodles.  Try brown rice.        Lean proteins   These can include lean meat, poultry, fish, and eggs.  You can also have tofu, beans, peas, lentils, nuts, and seeds.        Dairy   Try milk, yogurt, and cheese.  Choose low-fat or fat-free when you can.  If you need to, use lactose-free milk or fortified plant-based milk products, such as soy milk.        Water   Drink water when you're thirsty.  Limit sugar-sweetened drinks, including soda, fruit drinks, and sports drinks.  Where can you learn more?  Go to https://www.[employer].net/patiented  Enter T756 in the search box to learn more about \"Eating Healthy Foods: Care Instructions.\"  Current as of: September 20, 2023  Content Version: 14.2 2024 [employer].   Care instructions adapted under license by your healthcare professional. If you have questions about a medical condition or this instruction, always ask your healthcare professional. [employer] disclaims any warranty or liability for your use of this information.       "

## 2024-10-22 NOTE — PROGRESS NOTES
"Preventive Care Visit  [facility]  MICHAEL Bolaños, Physician Assistant - Medical  Oct 22, 2024      Assessment & Plan     Routine general medical examination at a health care facility  Repeat 1 year      Cervical cancer screening  - HPV and Gynecologic Cytology Panel - Recommended Age 30 - 65 Years    Cervical high risk HPV (human papillomavirus) test positive  - HPV and Gynecologic Cytology Panel - Recommended Age 30 - 65 Years    Tobacco use disorder  Discussed tobacco cessation today with patient. Treatment options were discussed along with resources such as quit.com. At this time patient has declined any intervention.    Screening for diabetes mellitus  Patient will return for fasting lab work  - Glucose; Future    Encounter for screening mammogram for malignant neoplasm of breast  - MA Screen Bilateral w/[place]; Future    Patient has been advised of split billing requirements and indicates understanding: Yes        Nicotine/Tobacco Cessation  She reports that she has been smoking cigarettes. She started smoking about 23 years ago. She has a 11.5 pack-year smoking history. She has never used smokeless tobacco.  Nicotine/Tobacco Cessation Plan  Information offered: Patient not interested at this time      BMI  Estimated body mass index is 28.62 kg/m  as calculated from the following:    Height as of this encounter: 1.745 m (5' 8.7\").    Weight as of this encounter: 87.1 kg (192 lb 2 oz).       Counseling  Appropriate preventive services were addressed with this patient via screening, questionnaire, or discussion as appropriate for fall prevention, nutrition, physical activity, Tobacco-use cessation, social engagement, weight loss and cognition.  Checklist reviewing preventive services available has been given to the patient.  Reviewed patient's diet, addressing concerns and/or questions.   The patient was instructed to see the dentist every 6 months.           Subjective   Shannen is " a 44 year old, presenting for the following:  Physical         Health Care Directive  Patient does not have a Health Care Directive or Living Will: Discussed advance care planning with patient; however, patient declined at this time.    HPI    Overall doing well. Has no questions or concerns today          10/17/2024   General Health   How would you rate your overall physical health? Good   Feel stress (tense, anxious, or unable to sleep) Very much      (!) STRESS CONCERN      10/17/2024   Nutrition   Three or more servings of calcium each day? (!) NO   Diet: Regular (no restrictions)   How many servings of fruit and vegetables per day? (!) 0-1   How many sweetened beverages each day? (!) I DON'T KNOW            10/17/2024   Exercise   Days per week of moderate/strenous exercise 7 days   Average minutes spent exercising at this level 150+ min            10/17/2024   Social Factors   Frequency of gathering with friends or relatives Three times a week   Worry food won't last until get money to buy more No   Food not last or not have enough money for food? No   Do you have housing? (Housing is defined as stable permanent housing and does not include staying ouside in a car, in a tent, in an abandoned building, in an overnight shelter, or couch-surfing.) Yes   Are you worried about losing your housing? No   Lack of transportation? No   Unable to get utilities (heat,electricity)? No            10/17/2024   Dental   Dentist two times every year? (!) NO            10/17/2024   TB Screening   Were you born outside of the [place]? No            Today's PHQ-2 Score:       10/21/2024     2:08 PM   PHQ-2 ( 1999 [employer])   Q1: Little interest or pleasure in doing things 0   Q2: Feeling down, depressed or hopeless 1   PHQ-2 Score 1   Q1: Little interest or pleasure in doing things Not at all   Q2: Feeling down, depressed or hopeless Several days   PHQ-2 Score 1           10/17/2024   Substance Use   Alcohol more than 3/day or more than  7/wk Not Applicable   Do you use any other substances recreationally? No        Social History     Tobacco Use    Smoking status: Every Day     Current packs/day: 0.50     Average packs/day: 0.5 packs/day for 23.0 years (11.5 ttl pk-yrs)     Types: Cigarettes     Start date: 10/22/2001    Smokeless tobacco: Never   Vaping Use    Vaping status: Never Used   Substance Use Topics    Alcohol use: Yes     Comment: Very ocassional    Drug use: No          Mammogram Screening - Mammogram every 1-2 years updated in Health Maintenance based on mutual decision making        10/17/2024   STI Screening   New sexual partner(s) since last STI/HIV test? (!) NO        History of abnormal Pap smear: YES - reflected in Problem List and Health Maintenance accordingly        Latest Ref Rng & Units 10/9/2023     3:10 PM 9/8/2022     4:06 PM 12/12/2019     5:09 PM   PAP / HPV   PAP  Negative for Intraepithelial Lesion or Malignancy (NILM)  Negative for Intraepithelial Lesion or Malignancy (NILM)     PAP (Historical)    NIL    HPV 16 DNA Negative Negative  Negative     HPV 18 DNA Negative Negative  Negative     Other HR HPV Negative Negative  Positive       ASCVD Risk   The 10-year ASCVD risk score (Sherman CASE, et al., 2019) is: 1.9%    Values used to calculate the score:      Age: 44 years      Sex: Female      Is Non- : No      Diabetic: No      Tobacco smoker: Yes      Systolic Blood Pressure: 106 mmHg      Is BP treated: No      HDL Cholesterol: 50 mg/dL      Total Cholesterol: 191 mg/dL        10/17/2024   Contraception/Family Planning   Questions about contraception or family planning No           Reviewed and updated as needed this visit by Provider   Tobacco   Meds  Problems   Surg Hx                   Review of Systems  Constitutional, HEENT, cardiovascular, pulmonary, GI, , musculoskeletal, neuro, skin, endocrine and psych systems are negative, except as otherwise noted.     Objective   "  Exam  /69   Pulse 94   Temp 98.9  F (37.2  C) (Oral)   Resp 18   Ht 1.745 m (5' 8.7\")   Wt 87.1 kg (192 lb 2 oz)   LMP 10/18/2024 (Exact Date)   SpO2 97%   BMI 28.62 kg/m     Estimated body mass index is 28.62 kg/m  as calculated from the following:    Height as of this encounter: 1.745 m (5' 8.7\").    Weight as of this encounter: 87.1 kg (192 lb 2 oz).    Physical Exam  GENERAL: alert and no distress  EYES: Eyes grossly normal to inspection, PERRL and conjunctivae and sclerae normal  HENT: ear canals and TM's normal, nose and mouth without ulcers or lesions  NECK: no adenopathy, no asymmetry, masses, or scars  RESP: lungs clear to auscultation - no rales, rhonchi or wheezes  CV: regular rate and rhythm, normal S1 S2, no S3 or S4, no murmur, click or rub, no peripheral edema  ABDOMEN: soft, nontender, no hepatosplenomegaly, no masses and bowel sounds normal   (female) w/bimanual: normal female external genitalia, normal urethral meatus, normal vaginal mucosa, and normal cervix/adnexa/uterus without masses or discharge  MS: no gross musculoskeletal defects noted, no edema  SKIN: no suspicious lesions or rashes  NEURO: Normal strength and tone, mentation intact and speech normal  PSYCH: mentation appears normal, affect normal/bright        Signed Electronically by: MICHAEL Bolaños    "

## 2024-10-22 NOTE — NURSING NOTE
Prior to immunization administration, verified patients identity using patient s name and date of birth. Please see Immunization Activity for additional information.     Screening Questionnaire for Adult Immunization    Are you sick today?   No   Do you have allergies to medications, food, a vaccine component or latex?   Yes   Have you ever had a serious reaction after receiving a vaccination?   No   Do you have a long-term health problem with heart, lung, kidney, or metabolic disease (e.g., diabetes), asthma, a blood disorder, no spleen, complement component deficiency, a cochlear implant, or a spinal fluid leak?  Are you on long-term aspirin therapy?   No   Do you have cancer, leukemia, HIV/AIDS, or any other immune system problem?   No   Do you have a parent, brother, or sister with an immune system problem?   No   In the past 3 months, have you taken medications that affect  your immune system, such as prednisone, other steroids, or anticancer drugs; drugs for the treatment of rheumatoid arthritis, Crohn s disease, or psoriasis; or have you had radiation treatments?   No   Have you had a seizure, or a brain or other nervous system problem?   No   During the past year, have you received a transfusion of blood or blood    products, or been given immune (gamma) globulin or antiviral drug?   No   For women: Are you pregnant or is there a chance you could become       pregnant during the next month?   No   Have you received any vaccinations in the past 4 weeks?   Yes     Immunization questionnaire was positive for at least one answer.  Notified Provider.      Patient instructed to remain in clinic for 15 minutes afterwards, and to report any adverse reactions.     Screening performed by Irma Argueta on 10/22/2024 at 12:56 PM.

## 2024-10-24 LAB
HPV HR 12 DNA CVX QL NAA+PROBE: NEGATIVE
HPV16 DNA CVX QL NAA+PROBE: NEGATIVE
HPV18 DNA CVX QL NAA+PROBE: NEGATIVE
HUMAN PAPILLOMA VIRUS FINAL DIAGNOSIS: NORMAL

## 2024-10-28 LAB
BKR AP ASSOCIATED HPV REPORT: NORMAL
BKR LAB AP GYN ADEQUACY: NORMAL
BKR LAB AP GYN INTERPRETATION: NORMAL
BKR LAB AP LMP: NORMAL
BKR LAB AP PREVIOUS ABNL DX: NORMAL
BKR LAB AP PREVIOUS ABNORMAL: NORMAL
PATH REPORT.COMMENTS IMP SPEC: NORMAL
PATH REPORT.COMMENTS IMP SPEC: NORMAL
PATH REPORT.RELEVANT HX SPEC: NORMAL

## 2024-11-01 ENCOUNTER — LAB (OUTPATIENT)
Dept: LAB | Facility: CLINIC | Age: 44
End: 2024-11-01
Payer: COMMERCIAL

## 2024-11-01 DIAGNOSIS — Z13.1 SCREENING FOR DIABETES MELLITUS: ICD-10-CM

## 2024-11-01 PROCEDURE — 82947 ASSAY GLUCOSE BLOOD QUANT: CPT

## 2024-11-01 PROCEDURE — 36415 COLL VENOUS BLD VENIPUNCTURE: CPT

## 2024-11-02 LAB
FASTING STATUS PATIENT QL REPORTED: YES
GLUCOSE SERPL-MCNC: 86 MG/DL (ref 70–99)

## 2024-11-19 ENCOUNTER — ANCILLARY PROCEDURE (OUTPATIENT)
Dept: MAMMOGRAPHY | Facility: CLINIC | Age: 44
End: 2024-11-19
Attending: PHYSICIAN ASSISTANT
Payer: COMMERCIAL

## 2024-11-19 DIAGNOSIS — Z12.31 ENCOUNTER FOR SCREENING MAMMOGRAM FOR MALIGNANT NEOPLASM OF BREAST: ICD-10-CM

## 2025-05-12 ENCOUNTER — TELEPHONE (OUTPATIENT)
Dept: FAMILY MEDICINE | Facility: CLINIC | Age: 45
End: 2025-05-12
Payer: COMMERCIAL

## 2025-05-12 NOTE — TELEPHONE ENCOUNTER
Patient requests that the appointment phone number for child's Neurology referral be sent to her via [facility] [employer].     Joanna NG  [facility]

## 2025-07-04 ENCOUNTER — APPOINTMENT (OUTPATIENT)
Dept: CT IMAGING | Facility: CLINIC | Age: 45
End: 2025-07-04
Attending: EMERGENCY MEDICINE
Payer: COMMERCIAL

## 2025-07-04 ENCOUNTER — HOSPITAL ENCOUNTER (EMERGENCY)
Facility: CLINIC | Age: 45
Discharge: HOME | End: 2025-07-04
Attending: EMERGENCY MEDICINE | Admitting: EMERGENCY MEDICINE
Payer: COMMERCIAL

## 2025-07-04 ENCOUNTER — APPOINTMENT (OUTPATIENT)
Dept: ULTRASOUND IMAGING | Facility: CLINIC | Age: 45
End: 2025-07-04
Attending: EMERGENCY MEDICINE
Payer: COMMERCIAL

## 2025-07-04 VITALS
BODY MASS INDEX: 30.36 KG/M2 | OXYGEN SATURATION: 100 % | HEIGHT: 69 IN | TEMPERATURE: 98.3 F | DIASTOLIC BLOOD PRESSURE: 63 MMHG | RESPIRATION RATE: 16 BRPM | HEART RATE: 62 BPM | SYSTOLIC BLOOD PRESSURE: 108 MMHG | WEIGHT: 205 LBS

## 2025-07-04 DIAGNOSIS — K80.20 SYMPTOMATIC CHOLELITHIASIS: ICD-10-CM

## 2025-07-04 LAB
ALBUMIN SERPL BCG-MCNC: 4.1 G/DL (ref 3.5–5.2)
ALBUMIN UR-MCNC: 10 MG/DL
ALP SERPL-CCNC: 50 U/L (ref 40–150)
ALT SERPL W P-5'-P-CCNC: 22 U/L (ref 0–50)
ANION GAP SERPL CALCULATED.3IONS-SCNC: 12 MMOL/L (ref 7–15)
APPEARANCE UR: CLEAR
AST SERPL W P-5'-P-CCNC: 18 U/L (ref 0–45)
BASOPHILS # BLD AUTO: 0 10E3/UL (ref 0–0.2)
BASOPHILS NFR BLD AUTO: 0 %
BILIRUB SERPL-MCNC: 0.2 MG/DL
BILIRUB UR QL STRIP: NEGATIVE
BUN SERPL-MCNC: 19.3 MG/DL (ref 6–20)
CALCIUM SERPL-MCNC: 8.7 MG/DL (ref 8.8–10.4)
CHLORIDE SERPL-SCNC: 108 MMOL/L (ref 98–107)
COLOR UR AUTO: YELLOW
CREAT SERPL-MCNC: 0.77 MG/DL (ref 0.51–0.95)
EGFRCR SERPLBLD CKD-EPI 2021: >90 ML/MIN/1.73M2
EOSINOPHIL # BLD AUTO: 0.1 10E3/UL (ref 0–0.7)
EOSINOPHIL NFR BLD AUTO: 1 %
ERYTHROCYTE [DISTWIDTH] IN BLOOD BY AUTOMATED COUNT: 13.4 % (ref 10–15)
GLUCOSE SERPL-MCNC: 113 MG/DL (ref 70–99)
GLUCOSE UR STRIP-MCNC: NEGATIVE MG/DL
HCG SERPL QL: NEGATIVE
HCO3 SERPL-SCNC: 22 MMOL/L (ref 22–29)
HCT VFR BLD AUTO: 40.6 % (ref 35–47)
HGB BLD-MCNC: 14 G/DL (ref 11.7–15.7)
HGB UR QL STRIP: NEGATIVE
IMM GRANULOCYTES # BLD: 0 10E3/UL
IMM GRANULOCYTES NFR BLD: 0 %
KETONES UR STRIP-MCNC: 10 MG/DL
LEUKOCYTE ESTERASE UR QL STRIP: NEGATIVE
LIPASE SERPL-CCNC: 22 U/L (ref 13–60)
LYMPHOCYTES # BLD AUTO: 1.2 10E3/UL (ref 0.8–5.3)
LYMPHOCYTES NFR BLD AUTO: 11 %
MCH RBC QN AUTO: 30.2 PG (ref 26.5–33)
MCHC RBC AUTO-ENTMCNC: 34.5 G/DL (ref 31.5–36.5)
MCV RBC AUTO: 88 FL (ref 78–100)
MONOCYTES # BLD AUTO: 0.3 10E3/UL (ref 0–1.3)
MONOCYTES NFR BLD AUTO: 3 %
MUCOUS THREADS #/AREA URNS LPF: PRESENT /LPF
NEUTROPHILS # BLD AUTO: 9.4 10E3/UL (ref 1.6–8.3)
NEUTROPHILS NFR BLD AUTO: 85 %
NITRATE UR QL: NEGATIVE
NRBC # BLD AUTO: 0 10E3/UL
NRBC BLD AUTO-RTO: 0 /100
PH UR STRIP: 5.5 [PH] (ref 5–7)
PLATELET # BLD AUTO: 156 10E3/UL (ref 150–450)
POTASSIUM SERPL-SCNC: 3.8 MMOL/L (ref 3.4–5.3)
PROT SERPL-MCNC: 6.7 G/DL (ref 6.4–8.3)
RBC # BLD AUTO: 4.64 10E6/UL (ref 3.8–5.2)
RBC URINE: 2 /HPF
SODIUM SERPL-SCNC: 142 MMOL/L (ref 135–145)
SP GR UR STRIP: 1.03 (ref 1–1.03)
SQUAMOUS EPITHELIAL: 3 /HPF
UROBILINOGEN UR STRIP-MCNC: NORMAL MG/DL
WBC # BLD AUTO: 11 10E3/UL (ref 4–11)
WBC URINE: 1 /HPF

## 2025-07-04 PROCEDURE — 99285 EMERGENCY DEPT VISIT HI MDM: CPT | Mod: 25 | Performed by: EMERGENCY MEDICINE

## 2025-07-04 PROCEDURE — 96374 THER/PROPH/DIAG INJ IV PUSH: CPT | Performed by: EMERGENCY MEDICINE

## 2025-07-04 PROCEDURE — 99284 EMERGENCY DEPT VISIT MOD MDM: CPT | Performed by: EMERGENCY MEDICINE

## 2025-07-04 PROCEDURE — 250N000011 HC RX IP 250 OP 636: Performed by: EMERGENCY MEDICINE

## 2025-07-04 PROCEDURE — 74176 CT ABD & PELVIS W/O CONTRAST: CPT

## 2025-07-04 PROCEDURE — 36415 COLL VENOUS BLD VENIPUNCTURE: CPT | Performed by: EMERGENCY MEDICINE

## 2025-07-04 PROCEDURE — 258N000003 HC RX IP 258 OP 636: Performed by: EMERGENCY MEDICINE

## 2025-07-04 PROCEDURE — 85004 AUTOMATED DIFF WBC COUNT: CPT | Performed by: EMERGENCY MEDICINE

## 2025-07-04 PROCEDURE — 84703 CHORIONIC GONADOTROPIN ASSAY: CPT | Performed by: EMERGENCY MEDICINE

## 2025-07-04 PROCEDURE — 80053 COMPREHEN METABOLIC PANEL: CPT | Performed by: EMERGENCY MEDICINE

## 2025-07-04 PROCEDURE — 83690 ASSAY OF LIPASE: CPT | Performed by: EMERGENCY MEDICINE

## 2025-07-04 PROCEDURE — 96361 HYDRATE IV INFUSION ADD-ON: CPT | Performed by: EMERGENCY MEDICINE

## 2025-07-04 PROCEDURE — 76705 ECHO EXAM OF ABDOMEN: CPT

## 2025-07-04 PROCEDURE — 81001 URINALYSIS AUTO W/SCOPE: CPT | Performed by: EMERGENCY MEDICINE

## 2025-07-04 PROCEDURE — 96376 TX/PRO/DX INJ SAME DRUG ADON: CPT | Performed by: EMERGENCY MEDICINE

## 2025-07-04 PROCEDURE — 96375 TX/PRO/DX INJ NEW DRUG ADDON: CPT | Performed by: EMERGENCY MEDICINE

## 2025-07-04 RX ORDER — NAPROXEN 500 MG/1
500 TABLET ORAL 2 TIMES DAILY PRN
Qty: 20 TABLET | Refills: 0 | Status: ON HOLD | OUTPATIENT
Start: 2025-07-04 | End: 2025-07-06

## 2025-07-04 RX ORDER — ONDANSETRON 4 MG/1
8 TABLET, ORALLY DISINTEGRATING ORAL EVERY 8 HOURS PRN
Qty: 10 TABLET | Refills: 0 | Status: SHIPPED | OUTPATIENT
Start: 2025-07-04 | End: 2025-07-07

## 2025-07-04 RX ORDER — HYDROMORPHONE HYDROCHLORIDE 1 MG/ML
0.5 INJECTION, SOLUTION INTRAMUSCULAR; INTRAVENOUS; SUBCUTANEOUS
Refills: 0 | Status: DISCONTINUED | OUTPATIENT
Start: 2025-07-04 | End: 2025-07-04 | Stop reason: HOSPADM

## 2025-07-04 RX ORDER — ONDANSETRON 2 MG/ML
4 INJECTION INTRAMUSCULAR; INTRAVENOUS EVERY 30 MIN PRN
Status: DISCONTINUED | OUTPATIENT
Start: 2025-07-04 | End: 2025-07-04 | Stop reason: HOSPADM

## 2025-07-04 RX ORDER — OXYCODONE HYDROCHLORIDE 5 MG/1
5 TABLET ORAL EVERY 6 HOURS PRN
Qty: 12 TABLET | Refills: 0 | Status: ON HOLD | OUTPATIENT
Start: 2025-07-04 | End: 2025-07-06

## 2025-07-04 RX ADMIN — ONDANSETRON 4 MG: 2 INJECTION INTRAMUSCULAR; INTRAVENOUS at 04:04

## 2025-07-04 RX ADMIN — HYDROMORPHONE HYDROCHLORIDE 0.5 MG: 1 INJECTION, SOLUTION INTRAMUSCULAR; INTRAVENOUS; SUBCUTANEOUS at 04:04

## 2025-07-04 RX ADMIN — HYDROMORPHONE HYDROCHLORIDE 1 MG: 1 INJECTION, SOLUTION INTRAMUSCULAR; INTRAVENOUS; SUBCUTANEOUS at 05:15

## 2025-07-04 RX ADMIN — SODIUM CHLORIDE 1000 ML: 0.9 INJECTION, SOLUTION INTRAVENOUS at 04:07

## 2025-07-04 ASSESSMENT — COLUMBIA-SUICIDE SEVERITY RATING SCALE - C-SSRS
6. HAVE YOU EVER DONE ANYTHING, STARTED TO DO ANYTHING, OR PREPARED TO DO ANYTHING TO END YOUR LIFE?: NO
2. HAVE YOU ACTUALLY HAD ANY THOUGHTS OF KILLING YOURSELF IN THE PAST MONTH?: NO
1. IN THE PAST MONTH, HAVE YOU WISHED YOU WERE DEAD OR WISHED YOU COULD GO TO SLEEP AND NOT WAKE UP?: NO

## 2025-07-04 ASSESSMENT — ACTIVITIES OF DAILY LIVING (ADL)
ADLS_ACUITY_SCORE: 41

## 2025-07-04 NOTE — DISCHARGE INSTRUCTIONS
Please make an appointment to follow up with Surgery - General Clinic(phone: [phone]) as soon as possible, a referral was placed in the emergency department for Dr Mooney or colleague.

## 2025-07-04 NOTE — ED TRIAGE NOTES
Patient comes in today with severe abdominal and lower right sided back pain that started a couple hours PTA. Patient has also been experiencing N/V, with 3-4 episodes of emesis, 1 being in triage. Patient appears to be acutely in pain, hunching over wheelchair and unable to get comfortable.

## 2025-07-04 NOTE — ED PROVIDER NOTES
"Emergency Department I-PASS Sign-out      Illness Severity: Stable    Patient Summary:  45 year old female with pertinent PMH of no previous significant medical problem who presented with severe abdominal and flank pain.    ED Course/treatment plan: Seen on prior shift, workup revealed potential evidence of cholelithiasis or cholecystitis and was treated symptomatic    Clinical Impression:  No diagnosis found.      Action List:  -To do:  Serial exam  Imaging: Right upper quadrant ultrasound    Situational Awareness & Contingency Planning:  Code Status (Most recent):  Prior    Disposition:  to be determined      Synthesis & Events after sign-out:  /63   Pulse 62   Temp 98.3  F (36.8  C) (Oral)   Resp 16   Ht 1.753 m (5' 9\")   Wt 93 kg (205 lb)   LMP 06/30/2025   SpO2 100%   BMI 30.27 kg/m      EXAM:  HEENT: Normal.  Oropharynx clear and moist.  Neck: Supple, trachea midline, normal voice  Chest:  No respiratory distress, speaks in complete sentences, chest wall nontender, lungs clear in all fields  CV: Regular rate and rhythm, no murmur, normal pulse, no jugular venous distention  Abdomen: Nondistended, very mild tenderness in right upper quadrant, no peritoneal signs.  No hepatosplenomegaly.  Extremities: No edema or tenderness    Ultrasound shows cholelithiasis without signs of cholecystitis.  Serial abdominal exam nonsurgical, patient is comfortable, would prefer a trial of outpatient management.  Will make a referral to surgery and provide symptomatic medications.    We discussed the indications for emergency department return and follow-up.  Stable for discharge.            Irineo Lynch MD   Emergency Medicine     Irineo Lynch MD  07/04/25 0904    "

## 2025-07-05 ENCOUNTER — HOSPITAL ENCOUNTER (INPATIENT)
Facility: CLINIC | Age: 45
LOS: 1 days | Discharge: HOME | End: 2025-07-06
Attending: FAMILY MEDICINE | Admitting: SURGERY
Payer: COMMERCIAL

## 2025-07-05 ENCOUNTER — APPOINTMENT (OUTPATIENT)
Dept: ULTRASOUND IMAGING | Facility: CLINIC | Age: 45
End: 2025-07-05
Attending: FAMILY MEDICINE
Payer: COMMERCIAL

## 2025-07-05 DIAGNOSIS — K81.0 ACUTE CHOLECYSTITIS: ICD-10-CM

## 2025-07-05 DIAGNOSIS — K80.20 SYMPTOMATIC CHOLELITHIASIS: ICD-10-CM

## 2025-07-05 LAB
ALBUMIN SERPL BCG-MCNC: 3.9 G/DL (ref 3.5–5.2)
ALP SERPL-CCNC: 50 U/L (ref 40–150)
ALT SERPL W P-5'-P-CCNC: 18 U/L (ref 0–50)
ANION GAP SERPL CALCULATED.3IONS-SCNC: 10 MMOL/L (ref 7–15)
AST SERPL W P-5'-P-CCNC: 12 U/L (ref 0–45)
BASOPHILS # BLD AUTO: 0 10E3/UL (ref 0–0.2)
BASOPHILS NFR BLD AUTO: 0 %
BILIRUB SERPL-MCNC: 0.5 MG/DL
BUN SERPL-MCNC: 9.2 MG/DL (ref 6–20)
CALCIUM SERPL-MCNC: 8.3 MG/DL (ref 8.8–10.4)
CHLORIDE SERPL-SCNC: 105 MMOL/L (ref 98–107)
CREAT SERPL-MCNC: 0.55 MG/DL (ref 0.51–0.95)
EGFRCR SERPLBLD CKD-EPI 2021: >90 ML/MIN/1.73M2
EOSINOPHIL # BLD AUTO: 0.1 10E3/UL (ref 0–0.7)
EOSINOPHIL NFR BLD AUTO: 1 %
ERYTHROCYTE [DISTWIDTH] IN BLOOD BY AUTOMATED COUNT: 13.2 % (ref 10–15)
GLUCOSE SERPL-MCNC: 105 MG/DL (ref 70–99)
HCO3 SERPL-SCNC: 20 MMOL/L (ref 22–29)
HCT VFR BLD AUTO: 39.1 % (ref 35–47)
HGB BLD-MCNC: 13.6 G/DL (ref 11.7–15.7)
IMM GRANULOCYTES # BLD: 0 10E3/UL
IMM GRANULOCYTES NFR BLD: 0 %
LIPASE SERPL-CCNC: 15 U/L (ref 13–60)
LYMPHOCYTES # BLD AUTO: 1.4 10E3/UL (ref 0.8–5.3)
LYMPHOCYTES NFR BLD AUTO: 11 %
MCH RBC QN AUTO: 30.1 PG (ref 26.5–33)
MCHC RBC AUTO-ENTMCNC: 34.8 G/DL (ref 31.5–36.5)
MCV RBC AUTO: 87 FL (ref 78–100)
MONOCYTES # BLD AUTO: 0.6 10E3/UL (ref 0–1.3)
MONOCYTES NFR BLD AUTO: 5 %
NEUTROPHILS # BLD AUTO: 10.3 10E3/UL (ref 1.6–8.3)
NEUTROPHILS NFR BLD AUTO: 83 %
NRBC # BLD AUTO: 0 10E3/UL
NRBC BLD AUTO-RTO: 0 /100
PLAT MORPH BLD: NORMAL
PLATELET # BLD AUTO: 145 10E3/UL (ref 150–450)
POTASSIUM SERPL-SCNC: 3.9 MMOL/L (ref 3.4–5.3)
PROT SERPL-MCNC: 6.4 G/DL (ref 6.4–8.3)
RBC # BLD AUTO: 4.52 10E6/UL (ref 3.8–5.2)
RBC MORPH BLD: NORMAL
SODIUM SERPL-SCNC: 135 MMOL/L (ref 135–145)
WBC # BLD AUTO: 12.4 10E3/UL (ref 4–11)

## 2025-07-05 PROCEDURE — 258N000003 HC RX IP 258 OP 636: Performed by: STUDENT IN AN ORGANIZED HEALTH CARE EDUCATION/TRAINING PROGRAM

## 2025-07-05 PROCEDURE — 99285 EMERGENCY DEPT VISIT HI MDM: CPT | Performed by: FAMILY MEDICINE

## 2025-07-05 PROCEDURE — 76705 ECHO EXAM OF ABDOMEN: CPT

## 2025-07-05 PROCEDURE — 80053 COMPREHEN METABOLIC PANEL: CPT | Performed by: FAMILY MEDICINE

## 2025-07-05 PROCEDURE — 83690 ASSAY OF LIPASE: CPT | Performed by: FAMILY MEDICINE

## 2025-07-05 PROCEDURE — 96365 THER/PROPH/DIAG IV INF INIT: CPT | Performed by: FAMILY MEDICINE

## 2025-07-05 PROCEDURE — 96376 TX/PRO/DX INJ SAME DRUG ADON: CPT | Performed by: FAMILY MEDICINE

## 2025-07-05 PROCEDURE — 96375 TX/PRO/DX INJ NEW DRUG ADDON: CPT | Performed by: FAMILY MEDICINE

## 2025-07-05 PROCEDURE — 120N000002 HC R&B MED SURG/OB UMMC

## 2025-07-05 PROCEDURE — 36415 COLL VENOUS BLD VENIPUNCTURE: CPT | Performed by: FAMILY MEDICINE

## 2025-07-05 PROCEDURE — 250N000011 HC RX IP 250 OP 636: Performed by: STUDENT IN AN ORGANIZED HEALTH CARE EDUCATION/TRAINING PROGRAM

## 2025-07-05 PROCEDURE — 258N000003 HC RX IP 258 OP 636: Performed by: FAMILY MEDICINE

## 2025-07-05 PROCEDURE — 250N000011 HC RX IP 250 OP 636: Performed by: FAMILY MEDICINE

## 2025-07-05 PROCEDURE — 96366 THER/PROPH/DIAG IV INF ADDON: CPT | Performed by: FAMILY MEDICINE

## 2025-07-05 PROCEDURE — 99285 EMERGENCY DEPT VISIT HI MDM: CPT | Mod: 25 | Performed by: FAMILY MEDICINE

## 2025-07-05 PROCEDURE — 85025 COMPLETE CBC W/AUTO DIFF WBC: CPT | Performed by: FAMILY MEDICINE

## 2025-07-05 RX ORDER — ONDANSETRON 4 MG/1
8 TABLET, ORALLY DISINTEGRATING ORAL EVERY 8 HOURS PRN
Qty: 10 TABLET | Refills: 0 | Status: SHIPPED | OUTPATIENT
Start: 2025-07-05 | End: 2025-07-08

## 2025-07-05 RX ORDER — HYDROMORPHONE HCL IN WATER/PF 6 MG/30 ML
0.2 PATIENT CONTROLLED ANALGESIA SYRINGE INTRAVENOUS ONCE
Refills: 0 | Status: COMPLETED | OUTPATIENT
Start: 2025-07-05 | End: 2025-07-05

## 2025-07-05 RX ORDER — ONDANSETRON 2 MG/ML
4 INJECTION INTRAMUSCULAR; INTRAVENOUS EVERY 30 MIN PRN
Status: DISCONTINUED | OUTPATIENT
Start: 2025-07-05 | End: 2025-07-06

## 2025-07-05 RX ORDER — SODIUM CHLORIDE, SODIUM LACTATE, POTASSIUM CHLORIDE, CALCIUM CHLORIDE 600; 310; 30; 20 MG/100ML; MG/100ML; MG/100ML; MG/100ML
INJECTION, SOLUTION INTRAVENOUS CONTINUOUS
Status: DISCONTINUED | OUTPATIENT
Start: 2025-07-05 | End: 2025-07-06 | Stop reason: HOSPADM

## 2025-07-05 RX ORDER — IBUPROFEN 200 MG
600 TABLET ORAL EVERY 6 HOURS PRN
Qty: 30 TABLET | Refills: 0 | Status: SHIPPED | OUTPATIENT
Start: 2025-07-05

## 2025-07-05 RX ORDER — ONDANSETRON 2 MG/ML
4 INJECTION INTRAMUSCULAR; INTRAVENOUS ONCE
Status: COMPLETED | OUTPATIENT
Start: 2025-07-05 | End: 2025-07-05

## 2025-07-05 RX ORDER — KETOROLAC TROMETHAMINE 15 MG/ML
15 INJECTION, SOLUTION INTRAMUSCULAR; INTRAVENOUS ONCE
Status: COMPLETED | OUTPATIENT
Start: 2025-07-05 | End: 2025-07-05

## 2025-07-05 RX ORDER — OXYCODONE HYDROCHLORIDE 5 MG/1
5 TABLET ORAL EVERY 6 HOURS PRN
Qty: 12 TABLET | Refills: 0 | Status: SHIPPED | OUTPATIENT
Start: 2025-07-05 | End: 2025-07-06

## 2025-07-05 RX ORDER — HYDROMORPHONE HYDROCHLORIDE 1 MG/ML
0.5 INJECTION, SOLUTION INTRAMUSCULAR; INTRAVENOUS; SUBCUTANEOUS EVERY 30 MIN PRN
Refills: 0 | Status: COMPLETED | OUTPATIENT
Start: 2025-07-05 | End: 2025-07-06

## 2025-07-05 RX ORDER — PIPERACILLIN SODIUM, TAZOBACTAM SODIUM 4; .5 G/20ML; G/20ML
4.5 INJECTION, POWDER, LYOPHILIZED, FOR SOLUTION INTRAVENOUS ONCE
Status: COMPLETED | OUTPATIENT
Start: 2025-07-05 | End: 2025-07-05

## 2025-07-05 RX ORDER — ONDANSETRON 2 MG/ML
8 INJECTION INTRAMUSCULAR; INTRAVENOUS ONCE
Status: DISCONTINUED | OUTPATIENT
Start: 2025-07-05 | End: 2025-07-05

## 2025-07-05 RX ADMIN — SODIUM CHLORIDE, SODIUM LACTATE, POTASSIUM CHLORIDE, AND CALCIUM CHLORIDE: .6; .31; .03; .02 INJECTION, SOLUTION INTRAVENOUS at 20:08

## 2025-07-05 RX ADMIN — ONDANSETRON 4 MG: 2 INJECTION INTRAMUSCULAR; INTRAVENOUS at 12:02

## 2025-07-05 RX ADMIN — HYDROMORPHONE HYDROCHLORIDE 0.5 MG: 1 INJECTION, SOLUTION INTRAMUSCULAR; INTRAVENOUS; SUBCUTANEOUS at 18:53

## 2025-07-05 RX ADMIN — PIPERACILLIN AND TAZOBACTAM 4.5 G: 4; .5 INJECTION, POWDER, LYOPHILIZED, FOR SOLUTION INTRAVENOUS at 18:53

## 2025-07-05 RX ADMIN — HYDROMORPHONE HYDROCHLORIDE 0.2 MG: 0.2 INJECTION, SOLUTION INTRAMUSCULAR; INTRAVENOUS; SUBCUTANEOUS at 12:02

## 2025-07-05 RX ADMIN — KETOROLAC TROMETHAMINE 15 MG: 15 INJECTION, SOLUTION INTRAMUSCULAR; INTRAVENOUS at 12:01

## 2025-07-05 RX ADMIN — ONDANSETRON 4 MG: 2 INJECTION INTRAMUSCULAR; INTRAVENOUS at 18:53

## 2025-07-05 RX ADMIN — SODIUM CHLORIDE, SODIUM LACTATE, POTASSIUM CHLORIDE, AND CALCIUM CHLORIDE 1000 ML: .6; .31; .03; .02 INJECTION, SOLUTION INTRAVENOUS at 12:03

## 2025-07-05 ASSESSMENT — ACTIVITIES OF DAILY LIVING (ADL)
ADLS_ACUITY_SCORE: 41

## 2025-07-05 NOTE — DISCHARGE INSTRUCTIONS
Follow-up with surgery as per your previous referral.  Continue gallbladder diet.  Use Zofran, oxycodone, ibuprofen if needed for pain.  Return for intractable vomiting or pain.  Contacting your Doctor -   To contact a doctor, call   [phone] and ask for the resident on call for Surgery (answered 24 hours a day)   Emergency Department:  [facility]: [phone]  [facility]: [phone] 911 if you are in need of immediate or emergent help

## 2025-07-05 NOTE — ED PROVIDER NOTES
Emergency Department I-PASS Sign-out      Illness Severity: Stable    Patient Summary:  45 year old female with pertinent PMH of cholelithiasis who presented with upper quadrant pain, labs normal but ultrasound shows stone in gallbladder    ED Course/treatment plan: Treated for pain, pain improved.  This is her second ED visit in 24 hours.  Discussed with surgery no availability for cholecystectomy for at least 48 hours.  She would like an oral challenge potentially to go home but if fails will need admission to medicine    Clinical Impression:  (K80.20) Symptomatic cholelithiasis  Comment: Gallbladder neck stone      Edited by: Irineo Lynch MD at 7/5/2025 [id number]    Action List:  -To do:  Serial exam after patient eats    Situational Awareness & Contingency Planning:  Code Status (Most recent):  Prior    Disposition:  likely to be discharged    Edited by: Irineo Lynch MD at 7/5/2025 2372    Synthesis & Events after sign-out:  45 year old female with pertinent PMH of cholelithiasis who presented with upper quadrant pain, labs normal but ultrasound shows stone in gallbladder. Symptomatic cholelithiasis. Patient is trialing PO Intake - pain starts 2 hours after eating. If pain doesn't come back she has her meds and will discharge and follow-up with surgery in clinic.    If pain returns will re-discuss with surgery.    After PO challenge the patient was in severe pain and discomfort. Certainly presentation is concerning for symptomatic cholelithiasis and no longer amenable to outpatient management. With leukocytosis concern for possible early cholecystitis.     Patient NPO, started on maintenance fluids. Dilaudid and zofran PRN. Starting zosyn.    Discussed with Dr. Urrutia,  Gen Surg. Will transfer to  ED with plans for admission to general surgery. Likely OR tomorrow.      Kristopher Gipson MD   Emergency Medicine       Kristopher Gipson MD  07/05/25 [id number]

## 2025-07-05 NOTE — ED TRIAGE NOTES
Pt seen here yesterday. Gallstones found. Pt in 10/10 pain. Pt had one episode of vomiting this am.   Triage Assessment (Adult)       Row Name 07/05/25 1126          Triage Assessment    Airway WDL WDL        Respiratory WDL    Respiratory WDL WDL        Cardiac WDL    Cardiac WDL WDL        Peripheral/Neurovascular WDL    Peripheral Neurovascular WDL WDL        Cognitive/Neuro/Behavioral WDL    Cognitive/Neuro/Behavioral WDL WDL

## 2025-07-05 NOTE — ED PROVIDER NOTES
[facility] EMERGENCY DEPARTMENT ([facility])    7/05/25      ED PROVIDER NOTE   ED 19 11:38 AM   History     Chief Complaint   Patient presents with    Abdominal Pain     Pt seen here yesterday. Gallstones found. Pt in 10/10 pain. Pt had one episode of vomiting this am.     The history is provided by the patient and medical records.     Shannen Guzmán is a 45 year old female who presents the emergency department with recurrence of right upper quadrant abdominal pain, nausea, and vomiting.  She was seen in the emergency department yesterday with severe abdominal and lower right sided back pain, nausea, and vomiting, workup revealed cholelithiasis without evidence of cholecystitis.  She was discharged home with prescriptions for oxycodone, Naprosyn and Zofran and given a General Surgery referral.  However she was unable to fill the medications as pharmacies were closed due to the 4th of July holiday.  She developed recurrence of right upper quadrant abdominal pain, nausea and vomiting this morning and so presents for evaluation.  She last vomited when she woke up this morning. She ate 2 pieces of bread at 7:30 AM today, was able to keep this down but can't eat anything else. She did take 2 tablets of Zofran at 9 AM today.Continues to have severe right upper quadrant abdominal pain at this time. She was brought here by her mother, who is parking her car right now.      Past Medical History  Past Medical History:   Diagnosis Date    Acne     ASCUS on Pap smear 11/19/2007    colposcopy 1/14/08- WNL. paps NIL since    Headache(784.0)     Hidradenitides, suppurative     Human papillomavirus in conditions classified elsewhere and of unspecified site     NONSPECIFIC MEDICAL HISTORY     vaginal delivery 2003, RH neg    Other acne      Past Surgical History:   Procedure Laterality Date    HC LASER SURGERY OF CERVIX      laser to genital area, ? abnl paps     ibuprofen (ADVIL/MOTRIN) 200 MG tablet  ondansetron  "(ZOFRAN ODT) 4 MG ODT tab  ondansetron (ZOFRAN ODT) 4 MG ODT tab  oxyCODONE (ROXICODONE) 5 MG tablet  Ibuprofen (ADVIL PO)  naproxen (NAPROSYN) 500 MG tablet  oxyCODONE (ROXICODONE) 5 MG tablet      Allergies   Allergen Reactions    Bactrim [Sulfamethoxazole W/Trimethoprim] Rash     Family History  Family History   Problem Relation Age of Onset    Cancer Maternal Grandmother 74        01/2019    Cancer Paternal Grandmother     Cancer Paternal Grandfather     Other Cancer Father     C.A.D. No family hx of     Diabetes No family hx of     Hypertension No family hx of     Cerebrovascular Disease No family hx of     Breast Cancer No family hx of     Cancer - colorectal No family hx of     Prostate Cancer No family hx of      Social History   Social History     Tobacco Use    Smoking status: Every Day     Current packs/day: 0.50     Average packs/day: 0.5 packs/day for 23.7 years (11.9 ttl pk-yrs)     Types: Cigarettes     Start date: 10/22/2001    Smokeless tobacco: Never   Vaping Use    Vaping status: Never Used   Substance Use Topics    Alcohol use: Yes     Comment: Very ocassional    Drug use: No      A medically appropriate review of systems was performed with pertinent positives and negatives noted in the HPI, and all other systems negative.    Physical Exam   BP: 123/79  Pulse: 67  Temp: 98.4  F (36.9  C)  Resp: 20  Height: 175.3 cm (5' 9\")  Weight: 93 kg (205 lb)  SpO2: 97 %    Physical Exam  Vitals and nursing note reviewed.   Constitutional:       General: She is not in acute distress.     Appearance: Normal appearance. She is not diaphoretic.   HENT:      Head: Atraumatic.      Mouth/Throat:      Mouth: Mucous membranes are moist.   Eyes:      General: No scleral icterus.     Conjunctiva/sclera: Conjunctivae normal.   Cardiovascular:      Rate and Rhythm: Normal rate.      Heart sounds: Normal heart sounds.   Pulmonary:      Effort: No respiratory distress.      Breath sounds: Normal breath sounds. "   Abdominal:      General: Abdomen is flat.      Tenderness: There is abdominal tenderness in the right upper quadrant. There is no guarding or rebound.   Musculoskeletal:      Cervical back: Neck supple.   Skin:     General: Skin is warm.      Findings: No rash.   Neurological:      Mental Status: She is alert.           ED Course, Procedures, & Data      Procedures                Results for orders placed or performed during the hospital encounter of 07/05/25   US Abdomen Limited (RUQ)    Impression    IMPRESSION:  1.  Cholelithiasis with nonmobile stone in the gallbladder neck. Stable borderline gallbladder wall thickening. No pericholecystic fluid and negative sonographic Richmond's sign. Findings are equivocal for acute cholecystitis. Consider a HIDA scan for   further evaluation.       Comprehensive metabolic panel   Result Value Ref Range    Sodium 135 135 - 145 mmol/L    Potassium 3.9 3.4 - 5.3 mmol/L    Carbon Dioxide (CO2) 20 (L) 22 - 29 mmol/L    Anion Gap 10 7 - 15 mmol/L    Urea Nitrogen 9.2 6.0 - 20.0 mg/dL    Creatinine 0.55 0.51 - 0.95 mg/dL    GFR Estimate >90 >60 mL/min/1.73m2    Calcium 8.3 (L) 8.8 - 10.4 mg/dL    Chloride 105 98 - 107 mmol/L    Glucose 105 (H) 70 - 99 mg/dL    Alkaline Phosphatase 50 40 - 150 U/L    AST 12 0 - 45 U/L    ALT 18 0 - 50 U/L    Protein Total 6.4 6.4 - 8.3 g/dL    Albumin 3.9 3.5 - 5.2 g/dL    Bilirubin Total 0.5 <=1.2 mg/dL   Result Value Ref Range    Lipase 15 13 - 60 U/L   CBC with platelets and differential   Result Value Ref Range    WBC Count 12.4 (H) 4.0 - 11.0 10e3/uL    RBC Count 4.52 3.80 - 5.20 10e6/uL    Hemoglobin 13.6 11.7 - 15.7 g/dL    Hematocrit 39.1 35.0 - 47.0 %    MCV 87 78 - 100 fL    MCH 30.1 26.5 - 33.0 pg    MCHC 34.8 31.5 - 36.5 g/dL    RDW 13.2 10.0 - 15.0 %    Platelet Count 145 (L) 150 - 450 10e3/uL    % Neutrophils 83 %    % Lymphocytes 11 %    % Monocytes 5 %    % Eosinophils 1 %    % Basophils 0 %    % Immature Granulocytes 0 %    NRBCs  per 100 WBC 0 <1 /100    Absolute Neutrophils 10.3 (H) 1.6 - 8.3 10e3/uL    Absolute Lymphocytes 1.4 0.8 - 5.3 10e3/uL    Absolute Monocytes 0.6 0.0 - 1.3 10e3/uL    Absolute Eosinophils 0.1 0.0 - 0.7 10e3/uL    Absolute Basophils 0.0 0.0 - 0.2 10e3/uL    Absolute Immature Granulocytes 0.0 <=0.4 10e3/uL    Absolute NRBCs 0.0 10e3/uL   RBC and Platelet Morphology   Result Value Ref Range    RBC Morphology Confirmed RBC Indices     Platelet Assessment  Automated Count Confirmed. Platelet morphology is normal.     Automated Count Confirmed. Platelet morphology is normal.     Medications   lactated ringers BOLUS 1,000 mL (0 mLs Intravenous Stopped 7/5/25 1315)   ketorolac (TORADOL) injection 15 mg (15 mg Intravenous $Given 7/5/25 1201)   HYDROmorphone (DILAUDID) injection 0.2 mg (0.2 mg Intravenous $Given 7/5/25 1202)   ondansetron (ZOFRAN) injection 4 mg (4 mg Intravenous $Given 7/5/25 1202)          Critical care was not performed.     Medical Decision Making  The patient's presentation was of moderate complexity (an acute illness with systemic symptoms).    The patient's evaluation involved:  review of external note(s) from 1 sources (review of ED visit 7/4/2025)  review of 2 test result(s) ordered prior to this encounter (review of labs, CT and ultrasound from visit 7/4/2025)  ordering and/or review of 3+ test(s) in this encounter (see separate area of note for details)  independent interpretation of testing performed by another health professional (right upper quadrant ultrasound was reviewed, also reviewed radiologist report, also reviewed with surgeon)  discussion of management or test interpretation with another health professional (discussed with surgeon)    The patient's management necessitated moderate risk (prescription drug management including medications given in the ED), high risk (a parenteral controlled substance), high risk (a decision regarding hospitalization), and further care after sign-out to   [facility] (see their note for further management).    Assessment & Plan    Shannen Guzmán is a 45 year old female who presents the emergency department with recurrence of right upper quadrant abdominal pain, nausea, and vomiting.  Seen yesterday diagnosed with symptomatic cholelithiasis.  CT with question of gallbladder wall thickening but no definite cholecystitis.  Was doing well at discharge, however when attempted to eat at home developed recurrent severe crampy pain nausea and vomiting  Today on exam vital signs normal.  Patient feels uncomfortable.  She has right upper quadrant tenderness with a positive Richmond sign.  Physical exam otherwise negative.  White blood cell count slightly elevated when compared to yesterday.  Liver enzymes still no signs of obstruction, however ultrasound now shows that one of the gallstones is impacted in the gallbladder neck.  Patient has not had any recurrent pain.  Thus far tolerating oral challenge.  She previously failed home management likely due to stone lodged in gallbladder neck when she eats.  I discussed with surgery, I do not have available OR time until at least Monday, and patient is not a candidate for emergency surgery as she does not have signs of cholecystitis, sepsis or significant biliary tree obstruction.  Her symptoms however make it difficult to discharge her.  An oral challenge was provided.  Should she tolerate this, could be discharged home with symptomatic medications and already has been referred to surgery for elective cholecystectomy.  If she fails oral challenge will need to be admitted to medicine for symptomatic treatment and surgical consult.    I have reviewed the nursing notes. I have reviewed the findings, diagnosis, plan and need for follow up with the patient.    New Prescriptions    IBUPROFEN (ADVIL/MOTRIN) 200 MG TABLET    Take 3 tablets (600 mg) by mouth every 6 hours as needed for pain.    ONDANSETRON (ZOFRAN ODT) 4 MG ODT TAB    Take  2 tablets (8 mg) by mouth every 8 hours as needed for nausea.    OXYCODONE (ROXICODONE) 5 MG TABLET    Take 1 tablet (5 mg) by mouth every 6 hours as needed for breakthrough pain.       Final diagnoses:   Symptomatic cholelithiasis - Gallbladder neck stone     I, Vivian Diaz, am serving as a trained medical scribe to document services personally performed by Irineo Lynch MD based on the provider's statements to me on July 5, 2025.  This document has been checked and approved by the attending provider.    IIrineo MD, was physically present and have reviewed and verified the accuracy of this note documented by Vivian Diaz, medical scribe.      Irineo Lynch MD   [facility] EMERGENCY DEPARTMENT  7/5/2025     Irineo Lynch MD  07/05/25 1602

## 2025-07-06 ENCOUNTER — ANESTHESIA EVENT (OUTPATIENT)
Dept: SURGERY | Facility: CLINIC | Age: 45
End: 2025-07-06
Payer: COMMERCIAL

## 2025-07-06 ENCOUNTER — ANESTHESIA (OUTPATIENT)
Dept: SURGERY | Facility: CLINIC | Age: 45
End: 2025-07-06
Payer: COMMERCIAL

## 2025-07-06 VITALS
DIASTOLIC BLOOD PRESSURE: 76 MMHG | HEIGHT: 69 IN | HEART RATE: 69 BPM | TEMPERATURE: 98.8 F | WEIGHT: 205 LBS | OXYGEN SATURATION: 96 % | SYSTOLIC BLOOD PRESSURE: 126 MMHG | RESPIRATION RATE: 14 BRPM | BODY MASS INDEX: 30.36 KG/M2

## 2025-07-06 LAB
ANION GAP SERPL CALCULATED.3IONS-SCNC: 8 MMOL/L (ref 7–15)
BUN SERPL-MCNC: 8.6 MG/DL (ref 6–20)
CALCIUM SERPL-MCNC: 8.3 MG/DL (ref 8.8–10.4)
CHLORIDE SERPL-SCNC: 107 MMOL/L (ref 98–107)
CREAT SERPL-MCNC: 0.64 MG/DL (ref 0.51–0.95)
EGFRCR SERPLBLD CKD-EPI 2021: >90 ML/MIN/1.73M2
ERYTHROCYTE [DISTWIDTH] IN BLOOD BY AUTOMATED COUNT: 13.4 % (ref 10–15)
GLUCOSE BLDC GLUCOMTR-MCNC: 90 MG/DL (ref 70–99)
GLUCOSE SERPL-MCNC: 98 MG/DL (ref 70–99)
HCO3 SERPL-SCNC: 24 MMOL/L (ref 22–29)
HCT VFR BLD AUTO: 37 % (ref 35–47)
HGB BLD-MCNC: 12.3 G/DL (ref 11.7–15.7)
MCH RBC QN AUTO: 29.4 PG (ref 26.5–33)
MCHC RBC AUTO-ENTMCNC: 33.2 G/DL (ref 31.5–36.5)
MCV RBC AUTO: 88 FL (ref 78–100)
PLAT MORPH BLD: NORMAL
PLATELET # BLD AUTO: 138 10E3/UL (ref 150–450)
POTASSIUM SERPL-SCNC: 3.8 MMOL/L (ref 3.4–5.3)
RBC # BLD AUTO: 4.19 10E6/UL (ref 3.8–5.2)
RBC MORPH BLD: NORMAL
SODIUM SERPL-SCNC: 139 MMOL/L (ref 135–145)
WBC # BLD AUTO: 8.4 10E3/UL (ref 4–11)

## 2025-07-06 PROCEDURE — 250N000011 HC RX IP 250 OP 636

## 2025-07-06 PROCEDURE — 250N000011 HC RX IP 250 OP 636: Performed by: SURGERY

## 2025-07-06 PROCEDURE — 47562 LAPAROSCOPIC CHOLECYSTECTOMY: CPT | Mod: GC | Performed by: SURGERY

## 2025-07-06 PROCEDURE — 999N000141 HC STATISTIC PRE-PROCEDURE NURSING ASSESSMENT: Performed by: SURGERY

## 2025-07-06 PROCEDURE — 85014 HEMATOCRIT: CPT

## 2025-07-06 PROCEDURE — 88304 TISSUE EXAM BY PATHOLOGIST: CPT | Mod: TC | Performed by: SURGERY

## 2025-07-06 PROCEDURE — 360N000076 HC SURGERY LEVEL 3, PER MIN: Performed by: SURGERY

## 2025-07-06 PROCEDURE — 0FT44ZZ RESECTION OF GALLBLADDER, PERCUTANEOUS ENDOSCOPIC APPROACH: ICD-10-PCS | Performed by: SURGERY

## 2025-07-06 PROCEDURE — 710N000010 HC RECOVERY PHASE 1, LEVEL 2, PER MIN: Performed by: SURGERY

## 2025-07-06 PROCEDURE — 258N000003 HC RX IP 258 OP 636

## 2025-07-06 PROCEDURE — 250N000009 HC RX 250

## 2025-07-06 PROCEDURE — 272N000001 HC OR GENERAL SUPPLY STERILE: Performed by: SURGERY

## 2025-07-06 PROCEDURE — 250N000013 HC RX MED GY IP 250 OP 250 PS 637

## 2025-07-06 PROCEDURE — 36415 COLL VENOUS BLD VENIPUNCTURE: CPT

## 2025-07-06 PROCEDURE — 258N000003 HC RX IP 258 OP 636: Performed by: STUDENT IN AN ORGANIZED HEALTH CARE EDUCATION/TRAINING PROGRAM

## 2025-07-06 PROCEDURE — 370N000017 HC ANESTHESIA TECHNICAL FEE, PER MIN: Performed by: SURGERY

## 2025-07-06 PROCEDURE — 0FD44ZX EXTRACTION OF GALLBLADDER, PERCUTANEOUS ENDOSCOPIC APPROACH, DIAGNOSTIC: ICD-10-PCS | Performed by: SURGERY

## 2025-07-06 PROCEDURE — 88304 TISSUE EXAM BY PATHOLOGIST: CPT | Mod: 26 | Performed by: PATHOLOGY

## 2025-07-06 PROCEDURE — 82962 GLUCOSE BLOOD TEST: CPT

## 2025-07-06 PROCEDURE — 250N000025 HC SEVOFLURANE, PER MIN: Performed by: SURGERY

## 2025-07-06 PROCEDURE — 710N000012 HC RECOVERY PHASE 2, PER MINUTE: Performed by: SURGERY

## 2025-07-06 PROCEDURE — 250N000011 HC RX IP 250 OP 636: Performed by: STUDENT IN AN ORGANIZED HEALTH CARE EDUCATION/TRAINING PROGRAM

## 2025-07-06 PROCEDURE — 99239 HOSP IP/OBS DSCHRG MGMT >30: CPT | Mod: 24 | Performed by: SURGERY

## 2025-07-06 PROCEDURE — 80048 BASIC METABOLIC PNL TOTAL CA: CPT

## 2025-07-06 PROCEDURE — 99204 OFFICE O/P NEW MOD 45 MIN: CPT | Mod: GC | Performed by: SURGERY

## 2025-07-06 RX ORDER — PIPERACILLIN SODIUM, TAZOBACTAM SODIUM 3; .375 G/15ML; G/15ML
3.38 INJECTION, POWDER, LYOPHILIZED, FOR SOLUTION INTRAVENOUS EVERY 6 HOURS
Status: DISCONTINUED | OUTPATIENT
Start: 2025-07-06 | End: 2025-07-06 | Stop reason: HOSPADM

## 2025-07-06 RX ORDER — OXYCODONE HYDROCHLORIDE 5 MG/1
5-10 TABLET ORAL EVERY 4 HOURS PRN
Qty: 10 TABLET | Refills: 0 | Status: SHIPPED | OUTPATIENT
Start: 2025-07-06

## 2025-07-06 RX ORDER — HYDROMORPHONE HCL IN WATER/PF 6 MG/30 ML
0.2 PATIENT CONTROLLED ANALGESIA SYRINGE INTRAVENOUS EVERY 5 MIN PRN
Status: DISCONTINUED | OUTPATIENT
Start: 2025-07-06 | End: 2025-07-06 | Stop reason: HOSPADM

## 2025-07-06 RX ORDER — AMOXICILLIN 250 MG
1 CAPSULE ORAL 2 TIMES DAILY PRN
Status: DISCONTINUED | OUTPATIENT
Start: 2025-07-06 | End: 2025-07-06 | Stop reason: HOSPADM

## 2025-07-06 RX ORDER — ACETAMINOPHEN 500 MG
1000 TABLET ORAL 3 TIMES DAILY
Status: DISCONTINUED | OUTPATIENT
Start: 2025-07-06 | End: 2025-07-06 | Stop reason: HOSPADM

## 2025-07-06 RX ORDER — OXYCODONE HYDROCHLORIDE 10 MG/1
10 TABLET ORAL
Status: DISCONTINUED | OUTPATIENT
Start: 2025-07-06 | End: 2025-07-06 | Stop reason: HOSPADM

## 2025-07-06 RX ORDER — DEXAMETHASONE SODIUM PHOSPHATE 4 MG/ML
INJECTION, SOLUTION INTRA-ARTICULAR; INTRALESIONAL; INTRAMUSCULAR; INTRAVENOUS; SOFT TISSUE PRN
Status: DISCONTINUED | OUTPATIENT
Start: 2025-07-06 | End: 2025-07-06

## 2025-07-06 RX ORDER — IBUPROFEN 600 MG/1
600 TABLET, FILM COATED ORAL EVERY 6 HOURS PRN
Status: SHIPPED
Start: 2025-07-06

## 2025-07-06 RX ORDER — DEXAMETHASONE SODIUM PHOSPHATE 4 MG/ML
4 INJECTION, SOLUTION INTRA-ARTICULAR; INTRALESIONAL; INTRAMUSCULAR; INTRAVENOUS; SOFT TISSUE
Status: DISCONTINUED | OUTPATIENT
Start: 2025-07-06 | End: 2025-07-06 | Stop reason: HOSPADM

## 2025-07-06 RX ORDER — CEFAZOLIN SODIUM/WATER 2 G/20 ML
2 SYRINGE (ML) INTRAVENOUS SEE ADMIN INSTRUCTIONS
Status: DISCONTINUED | OUTPATIENT
Start: 2025-07-06 | End: 2025-07-06 | Stop reason: HOSPADM

## 2025-07-06 RX ORDER — SODIUM CHLORIDE, SODIUM LACTATE, POTASSIUM CHLORIDE, CALCIUM CHLORIDE 600; 310; 30; 20 MG/100ML; MG/100ML; MG/100ML; MG/100ML
INJECTION, SOLUTION INTRAVENOUS CONTINUOUS
Status: DISCONTINUED | OUTPATIENT
Start: 2025-07-06 | End: 2025-07-06 | Stop reason: HOSPADM

## 2025-07-06 RX ORDER — AMOXICILLIN 250 MG
2 CAPSULE ORAL 2 TIMES DAILY PRN
Status: DISCONTINUED | OUTPATIENT
Start: 2025-07-06 | End: 2025-07-06 | Stop reason: HOSPADM

## 2025-07-06 RX ORDER — SODIUM CHLORIDE, SODIUM LACTATE, POTASSIUM CHLORIDE, CALCIUM CHLORIDE 600; 310; 30; 20 MG/100ML; MG/100ML; MG/100ML; MG/100ML
INJECTION, SOLUTION INTRAVENOUS CONTINUOUS PRN
Status: DISCONTINUED | OUTPATIENT
Start: 2025-07-06 | End: 2025-07-06

## 2025-07-06 RX ORDER — ACETAMINOPHEN 325 MG/1
975 TABLET ORAL ONCE
Status: DISCONTINUED | OUTPATIENT
Start: 2025-07-06 | End: 2025-07-06 | Stop reason: HOSPADM

## 2025-07-06 RX ORDER — LIDOCAINE HYDROCHLORIDE AND EPINEPHRINE 10; 10 MG/ML; UG/ML
INJECTION, SOLUTION INFILTRATION; PERINEURAL PRN
Status: DISCONTINUED | OUTPATIENT
Start: 2025-07-06 | End: 2025-07-06 | Stop reason: HOSPADM

## 2025-07-06 RX ORDER — ONDANSETRON 4 MG/1
4 TABLET, ORALLY DISINTEGRATING ORAL EVERY 30 MIN PRN
Status: DISCONTINUED | OUTPATIENT
Start: 2025-07-06 | End: 2025-07-06 | Stop reason: HOSPADM

## 2025-07-06 RX ORDER — ACETAMINOPHEN 500 MG
500-1000 TABLET ORAL EVERY 8 HOURS
Qty: 50 TABLET | Refills: 0 | Status: SHIPPED | OUTPATIENT
Start: 2025-07-06

## 2025-07-06 RX ORDER — NALOXONE HYDROCHLORIDE 0.4 MG/ML
0.1 INJECTION, SOLUTION INTRAMUSCULAR; INTRAVENOUS; SUBCUTANEOUS
Status: DISCONTINUED | OUTPATIENT
Start: 2025-07-06 | End: 2025-07-06 | Stop reason: HOSPADM

## 2025-07-06 RX ORDER — OXYCODONE HYDROCHLORIDE 5 MG/1
5-10 TABLET ORAL EVERY 4 HOURS PRN
Refills: 0 | Status: DISCONTINUED | OUTPATIENT
Start: 2025-07-06 | End: 2025-07-06 | Stop reason: HOSPADM

## 2025-07-06 RX ORDER — CEFAZOLIN SODIUM/WATER 2 G/20 ML
2 SYRINGE (ML) INTRAVENOUS
Status: COMPLETED | OUTPATIENT
Start: 2025-07-06 | End: 2025-07-06

## 2025-07-06 RX ORDER — PROCHLORPERAZINE MALEATE 10 MG
10 TABLET ORAL EVERY 6 HOURS PRN
Status: DISCONTINUED | OUTPATIENT
Start: 2025-07-06 | End: 2025-07-06 | Stop reason: HOSPADM

## 2025-07-06 RX ORDER — ONDANSETRON 2 MG/ML
4 INJECTION INTRAMUSCULAR; INTRAVENOUS EVERY 30 MIN PRN
Status: DISCONTINUED | OUTPATIENT
Start: 2025-07-06 | End: 2025-07-06 | Stop reason: HOSPADM

## 2025-07-06 RX ORDER — OXYCODONE HYDROCHLORIDE 5 MG/1
5 TABLET ORAL
Status: COMPLETED | OUTPATIENT
Start: 2025-07-06 | End: 2025-07-06

## 2025-07-06 RX ORDER — ONDANSETRON 2 MG/ML
INJECTION INTRAMUSCULAR; INTRAVENOUS PRN
Status: DISCONTINUED | OUTPATIENT
Start: 2025-07-06 | End: 2025-07-06

## 2025-07-06 RX ORDER — HYDROMORPHONE HCL IN WATER/PF 6 MG/30 ML
0.4 PATIENT CONTROLLED ANALGESIA SYRINGE INTRAVENOUS EVERY 5 MIN PRN
Status: DISCONTINUED | OUTPATIENT
Start: 2025-07-06 | End: 2025-07-06 | Stop reason: HOSPADM

## 2025-07-06 RX ORDER — FENTANYL CITRATE 50 UG/ML
25 INJECTION, SOLUTION INTRAMUSCULAR; INTRAVENOUS EVERY 5 MIN PRN
Status: DISCONTINUED | OUTPATIENT
Start: 2025-07-06 | End: 2025-07-06 | Stop reason: HOSPADM

## 2025-07-06 RX ORDER — ONDANSETRON 2 MG/ML
4 INJECTION INTRAMUSCULAR; INTRAVENOUS EVERY 6 HOURS PRN
Status: DISCONTINUED | OUTPATIENT
Start: 2025-07-06 | End: 2025-07-06 | Stop reason: HOSPADM

## 2025-07-06 RX ORDER — OXYCODONE HYDROCHLORIDE 5 MG/1
5 TABLET ORAL EVERY 6 HOURS PRN
Qty: 12 TABLET | Refills: 0 | Status: SHIPPED | OUTPATIENT
Start: 2025-07-06 | End: 2025-07-06

## 2025-07-06 RX ORDER — ONDANSETRON 4 MG/1
4 TABLET, ORALLY DISINTEGRATING ORAL EVERY 6 HOURS PRN
Status: DISCONTINUED | OUTPATIENT
Start: 2025-07-06 | End: 2025-07-06 | Stop reason: HOSPADM

## 2025-07-06 RX ORDER — FENTANYL CITRATE 50 UG/ML
INJECTION, SOLUTION INTRAMUSCULAR; INTRAVENOUS PRN
Status: DISCONTINUED | OUTPATIENT
Start: 2025-07-06 | End: 2025-07-06

## 2025-07-06 RX ORDER — FENTANYL CITRATE 50 UG/ML
50 INJECTION, SOLUTION INTRAMUSCULAR; INTRAVENOUS EVERY 5 MIN PRN
Status: DISCONTINUED | OUTPATIENT
Start: 2025-07-06 | End: 2025-07-06 | Stop reason: HOSPADM

## 2025-07-06 RX ORDER — LIDOCAINE HYDROCHLORIDE 20 MG/ML
INJECTION, SOLUTION INFILTRATION; PERINEURAL PRN
Status: DISCONTINUED | OUTPATIENT
Start: 2025-07-06 | End: 2025-07-06

## 2025-07-06 RX ORDER — PROPOFOL 10 MG/ML
INJECTION, EMULSION INTRAVENOUS PRN
Status: DISCONTINUED | OUTPATIENT
Start: 2025-07-06 | End: 2025-07-06

## 2025-07-06 RX ORDER — LIDOCAINE 40 MG/G
CREAM TOPICAL
Status: DISCONTINUED | OUTPATIENT
Start: 2025-07-06 | End: 2025-07-06 | Stop reason: HOSPADM

## 2025-07-06 RX ADMIN — ONDANSETRON 4 MG: 2 INJECTION INTRAMUSCULAR; INTRAVENOUS at 13:58

## 2025-07-06 RX ADMIN — HYDROMORPHONE HYDROCHLORIDE 0.5 MG: 1 INJECTION, SOLUTION INTRAMUSCULAR; INTRAVENOUS; SUBCUTANEOUS at 12:38

## 2025-07-06 RX ADMIN — ACETAMINOPHEN 1000 MG: 500 TABLET ORAL at 07:57

## 2025-07-06 RX ADMIN — Medication 10 MG: at 12:15

## 2025-07-06 RX ADMIN — FENTANYL CITRATE 100 MCG: 50 INJECTION INTRAMUSCULAR; INTRAVENOUS at 10:24

## 2025-07-06 RX ADMIN — FENTANYL CITRATE 25 MCG: 50 INJECTION, SOLUTION INTRAMUSCULAR; INTRAVENOUS at 13:31

## 2025-07-06 RX ADMIN — PROPOFOL 20 MG: 10 INJECTION, EMULSION INTRAVENOUS at 12:52

## 2025-07-06 RX ADMIN — DEXAMETHASONE SODIUM PHOSPHATE 8 MG: 4 INJECTION, SOLUTION INTRAMUSCULAR; INTRAVENOUS at 10:35

## 2025-07-06 RX ADMIN — SODIUM CHLORIDE, SODIUM LACTATE, POTASSIUM CHLORIDE, AND CALCIUM CHLORIDE: .6; .31; .03; .02 INJECTION, SOLUTION INTRAVENOUS at 06:33

## 2025-07-06 RX ADMIN — PROPOFOL 20 MG: 10 INJECTION, EMULSION INTRAVENOUS at 12:43

## 2025-07-06 RX ADMIN — PIPERACILLIN AND TAZOBACTAM 3.38 G: 3; .375 INJECTION, POWDER, LYOPHILIZED, FOR SOLUTION INTRAVENOUS at 08:09

## 2025-07-06 RX ADMIN — HYDROMORPHONE HYDROCHLORIDE 0.5 MG: 1 INJECTION, SOLUTION INTRAMUSCULAR; INTRAVENOUS; SUBCUTANEOUS at 10:52

## 2025-07-06 RX ADMIN — HYDROMORPHONE HYDROCHLORIDE 0.2 MG: 0.2 INJECTION, SOLUTION INTRAMUSCULAR; INTRAVENOUS; SUBCUTANEOUS at 14:28

## 2025-07-06 RX ADMIN — PIPERACILLIN AND TAZOBACTAM 3.38 G: 3; .375 INJECTION, POWDER, LYOPHILIZED, FOR SOLUTION INTRAVENOUS at 01:53

## 2025-07-06 RX ADMIN — Medication 10 MG: at 11:52

## 2025-07-06 RX ADMIN — LIDOCAINE HYDROCHLORIDE 80 MG: 20 INJECTION, SOLUTION INFILTRATION; PERINEURAL at 10:24

## 2025-07-06 RX ADMIN — HYDROMORPHONE HYDROCHLORIDE 0.5 MG: 1 INJECTION, SOLUTION INTRAMUSCULAR; INTRAVENOUS; SUBCUTANEOUS at 01:11

## 2025-07-06 RX ADMIN — HYDROMORPHONE HYDROCHLORIDE 0.5 MG: 1 INJECTION, SOLUTION INTRAMUSCULAR; INTRAVENOUS; SUBCUTANEOUS at 08:07

## 2025-07-06 RX ADMIN — Medication 200 MG: at 12:47

## 2025-07-06 RX ADMIN — ONDANSETRON 4 MG: 2 INJECTION INTRAMUSCULAR; INTRAVENOUS at 12:21

## 2025-07-06 RX ADMIN — HYDROMORPHONE HYDROCHLORIDE 0.2 MG: 0.2 INJECTION, SOLUTION INTRAMUSCULAR; INTRAVENOUS; SUBCUTANEOUS at 14:19

## 2025-07-06 RX ADMIN — HYDROMORPHONE HYDROCHLORIDE 0.2 MG: 0.2 INJECTION, SOLUTION INTRAMUSCULAR; INTRAVENOUS; SUBCUTANEOUS at 13:58

## 2025-07-06 RX ADMIN — FENTANYL CITRATE 50 MCG: 50 INJECTION INTRAMUSCULAR; INTRAVENOUS at 11:20

## 2025-07-06 RX ADMIN — Medication 50 MG: at 10:25

## 2025-07-06 RX ADMIN — SODIUM CHLORIDE, SODIUM LACTATE, POTASSIUM CHLORIDE, AND CALCIUM CHLORIDE: .6; .31; .03; .02 INJECTION, SOLUTION INTRAVENOUS at 10:28

## 2025-07-06 RX ADMIN — FENTANYL CITRATE 25 MCG: 50 INJECTION, SOLUTION INTRAMUSCULAR; INTRAVENOUS at 13:40

## 2025-07-06 RX ADMIN — Medication 10 MG: at 11:12

## 2025-07-06 RX ADMIN — PROPOFOL 150 MG: 10 INJECTION, EMULSION INTRAVENOUS at 10:25

## 2025-07-06 RX ADMIN — OXYCODONE HYDROCHLORIDE 5 MG: 5 TABLET ORAL at 14:33

## 2025-07-06 RX ADMIN — Medication 2 G: at 10:33

## 2025-07-06 ASSESSMENT — ACTIVITIES OF DAILY LIVING (ADL)
ADLS_ACUITY_SCORE: 41

## 2025-07-06 NOTE — ED NOTES
Report given to Marcela ERDDY, [facility] is ready to accept patient. Will set up transport.

## 2025-07-06 NOTE — OP NOTE
[facility]    Operative Note    Pre-operative diagnosis: Acute cholecystitis [K81.0]   Post-operative diagnosis Acute cholecystitis   Procedure: Procedure(s):  Laparoscopic cholecystectomy   Surgeon: Surgeons and Role:     * Benny Su MD - Primary     * Fang Schmitt MD - Resident - Assisting   Anesthesia: General    Estimated blood loss: 10 mL   Drains: None   Specimens: ID Type Source Tests Collected by Time Destination   1 : Gallbladder Tissue Gallbladder SURGICAL PATHOLOGY EXAM Benny Su MD 7/6/2025 11:43 AM       Findings: Distended and inflamed gallbladder, bile aspirated at beginning of case. Clearly visible common bile duct. Unclear if had a very short cystic duct branching off right hepatic, or an aberrant branch off cystic duct. Due to unclear anatomy, the cystic duct was ligated on the gallbladder side of this branch where it clearly was going into the gallbladder.    Complications: None.   Implants: None.       OPERATIVE INDICATIONS:  Shannen Guzmán is a 45 year old woman with a history of RUQ abdominal pain. Initially diagnosed 7/4 with symptomatic cholelithiasis but returned to the ED due to postprandial pain that persisted. Imaging, exam, and symptoms consistent with acute cholecystitis. Given her ongoing pain, we recommended cholecystectomy. After understanding the risks and benefits of proceeding with surgery, the patient elected to undergo laparoscopic cholecystectomy and consented to undergo surgery.     OPERATIVE DETAILS:  The patient was brought to the operating room and prepared in a routine fashion.  She was supine on the operating table and general anesthesia was induced. Preop antibiotics had been given prior to surgery. SCDs were placed and turned on. She was prepped and draped in the usual sterile fashion. A timeout was performed.      A 10 mm supraumbilical incision was made. The umbilical stalk was  grasped with a kocher clamp. Incision was made in the fascia and peritoneum bluntly entered with a gia clamp. 0 vicryl sutures placed on either side of the fascial incision and tagged to be tied at the end of the case. Dangelo 10 mm port was placed. Insufflation to 15 mmHg was performed. Next under direct visualization, a 5 mm subxiphoid port was placed as well as two right sided subcostal 5 mm ports. The patient was moved into a steep reverse Trendelenberg position. The gallbladder was very distended and firm and was thus aspirated with a needle and > 100 mL bilious/non-purulent fluid aspirated. The gallbladder was grasped at its fundus and retracted cephalad.  It was also grasped at its infundibulum and retracted laterally.       The peritoneal attachments were taken down near the infundibulum and dissection was carried medially and and then superiorly, then laterally and superiorly to mobilize the gallbladder. Using a combination of blunt dissection and electrocautery, the cystic artery and the cystic duct were dissected. The cystic artery was clipped securely and divided between clips. The common bile duct was clearly visible and it was unclear if the patient had a very short cystic duct branching off right hepatic, or an aberrant branch off cystic duct. Due to unclear anatomy, the cystic duct was securely clipped and divided on the gallbladder side of this branch where it clearly was going into the gallbladder. The gallbladder was then removed out of its fossa using electrocautery. It was placed into an Endocatch bag. Next, the gallbladder fossa was irrigated with sterile saline and the saline was aspirated. Complete hemostasis was achieved. The Endocatch bag containing the gallbladder was removed from the abdomen and given the size of the gallbladder the fascia had to be slightly extended caudally and the skin incision also extended (transversely). The ports were removed and the abdomen was desufflated. The  supraumbilical incision was closed using the previously placed 0 vicryl sutures and was sufficient to close the fascial defect. The skin was closed using 4-0 monocryl suture and dermabond was applied. All counts were correct at the end of the case. The patient went to PACU in stable condition.  No apparent complications.      Dr Su was present for the case.      Fang Schmitt MD  Surgery PGY5

## 2025-07-06 NOTE — DISCHARGE SUMMARY
[facility]  Discharge Summary  Colon and Rectal Surgery     Shannen Guzmán MRN# [id number]   YOB: 1980 Age: 45 year old     Date of Admission:  7/5/2025  Date of Discharge::  7/6/2025  3:59 PM  Admitting Physician:  Nathan Urrutia DO  Discharge Physician:  Benny Su MD   Primary Care Physician:        Maricarmen Irving          Admission Diagnoses:   Acute cholecystitis [K81.0]  Symptomatic cholelithiasis [K80.20]          Discharge Diagnosis:   Acute cholecystitis         Procedures:   7/6 Laparoscopic cholecystectomy  (Dr. Su)            Consultations:   None         Imaging Studies:     Results for orders placed or performed during the hospital encounter of 07/05/25   US Abdomen Limited (RUQ)    Narrative    EXAM: US ABDOMEN LIMITED  LOCATION: [facility]  DATE: 7/5/2025    INDICATION: Known cholelithiasis, ultrasound yesterday showed no signs of cholecystitis or obstruction however pain recurred, please eval for cholecystitis  COMPARISON: CT and ultrasound 7/4/2025  TECHNIQUE: Limited abdominal ultrasound.    FINDINGS:    GALLBLADDER: Cholelithiasis with multiple shadowing stones including nonmobile stones in the neck. Borderline gallbladder wall thickness to 5 mm, unchanged since the ultrasound yesterday when it measured 6 mm. No pericholecystic fluid. Sonographic Richmond   sign is reportedly negative.    BILE DUCTS: No biliary dilatation. The common duct measures 5 mm.    LIVER: Normal parenchyma with smooth contour. No focal mass. The portal vein is patent with flow in the normal direction.    RIGHT KIDNEY: No hydronephrosis.    PANCREAS: The visualized portions are normal.      Impression    IMPRESSION:  1.  Cholelithiasis with nonmobile stone in the gallbladder neck. Stable borderline gallbladder wall thickening. No pericholecystic fluid and negative  sonographic Richmond's sign. Findings are equivocal for acute cholecystitis. Consider a HIDA scan for   further evaluation.                Medications Prior to Admission:     No medications prior to admission.              Discharge Medications:     Discharge Medication List as of 7/6/2025  2:09 PM        START taking these medications    Details   acetaminophen (TYLENOL) 500 MG tablet Take 1-2 tablets (500-1,000 mg) by mouth every 8 hours., Disp-50 tablet, R-0, E-Prescribe      !! ibuprofen (ADVIL/MOTRIN) 200 MG tablet Take 3 tablets (600 mg) by mouth every 6 hours as needed for pain., Disp-30 tablet, R-0, E-Prescribe      !! ibuprofen (ADVIL/MOTRIN) 600 MG tablet Take 1 tablet (600 mg) by mouth every 6 hours as needed for other (mild and/or inflammatory pain)., No Print Out      !! ondansetron (ZOFRAN ODT) 4 MG ODT tab Take 2 tablets (8 mg) by mouth every 8 hours as needed for nausea., Disp-10 tablet, R-0, E-Prescribe       !! - Potential duplicate medications found. Please discuss with provider.        CONTINUE these medications which have CHANGED    Details   oxyCODONE (ROXICODONE) 5 MG tablet Take 1-2 tablets (5-10 mg) by mouth every 4 hours as needed for moderate to severe pain., Disp-10 tablet, R-0, Local Print           CONTINUE these medications which have NOT CHANGED    Details   !! Ibuprofen (ADVIL PO) Historical      !! ondansetron (ZOFRAN ODT) 4 MG ODT tab Take 2 tablets (8 mg) by mouth every 8 hours as needed for nausea., Disp-10 tablet, R-0, E-Prescribe       !! - Potential duplicate medications found. Please discuss with provider.        STOP taking these medications       naproxen (NAPROSYN) 500 MG tablet Comments:   Reason for Stopping:             Oxycodone 5mg 12 tablets had been picked up by patient/family at [facility] earlier in the day. The oxycodone 5mg 10 tablets prescribed here was cancelled.              Brief History of Illness:   From HPI  Shannen Guzmán is a 45 year old female with a  "history of hidradenitis suppurative, headache, and ASCUS who presented to the the ED on 7/5 with abdominal pain. Symptoms started 7/4 and she had initially presented 7/4 with US and CT showing cholelithiasis without findings of cholecystitis, symptoms resolved, and she was discharged home. Worsening pain with eating and returned 7/5 found to have acute cholecystitis based on symptoms, exam, and ultrasound. Initially presented to [facility] and then was transferred here. Discussed laparoscopic cholecystectomy, risks and benefits, and patient agreed to proceed with surgery.           Hospital Course:   She was admitted to [facility], kept NPO, and started on IV zosyn. On 7/6 patient underwent above operation. There were no complications and she tolerated this well. She met phase 2 criteria and was stable for discharge to home on same day as surgery. Antibiotics were discontinued post-operatively.         Day of Discharge Physical Exam:   Blood pressure 126/76, pulse 69, temperature 98.8  F (37.1  C), resp. rate 14, height 1.753 m (5' 9\"), weight 93 kg (205 lb), last menstrual period 06/30/2025, SpO2 96%, not currently breastfeeding.    Gen: Still slightly groggy from anesthesia but alert and answering questions appropriately  Pulm: Non-labored breathing on RA  Abd: Soft, appropriately tender, no guarding   Incisions C/D/I with dermabond  Ext:  Warm and well-perfused         Final Pathology Result:   Pending at time of discharge    Results for orders placed or performed in visit on 08/16/18   Surgical pathology exam    Collection Time: 08/16/18  2:00 PM   Result Value Ref Range    Copath Report       Patient Name: HALLEY SARAH  MR#: [id number]  Specimen #: [id number]  Collected: 8/16/2018  Received: 8/17/2018  Reported: 8/22/2018 19:36  Ordering Phy(s): HALLEY BARNES    For improved result formatting, select 'View Enhanced Report Format' under   Linked Documents section.    SPECIMEN(S):  Endocervical " "curettings    FINAL DIAGNOSIS:  Endocervix, curettings:  - A small amount of normal endocervical tissue.  - No squamous epithelium identified.    Electronically signed out by:    Felix Hughes M.D.    CLINICAL HISTORY:  38 year old female with NIL pap smear [id number] and positive high risk   HPV, not 16 or 18, on 5/3/2018.    GROSS:  One specimen container with formalin is received labeled with the   patient's name, date of birth, and medical  record number.  Information on the requisition slip, containers, and   associated labels is confirmed.    A.  The specimen is designated \"endocervical curettings\" consisting of a   Cytobrush in formalin from which a  1.0 cm aggreg ate of mucoid red brown non-cohesive material is obtained.    The entire specimen is filtered and  submitted in one cassette (Dictated by: Anna Murphy 8/17/2018 12:50   PM).    MICROSCOPIC:  Microscopic examination is performed.    CPT Codes:  A: 97551-CT3    TESTING LAB LOCATION:  [address] [address] [phone]    COLLECTION SITE:  Client: [facility]  Location: [facility] ([facility])                Discharge Instructions and Follow-Up:     Discharge Procedure Orders   When to call - Contact Surgeon Team   Order Comments: You may experience symptoms that require follow-up before your scheduled appointment. Contact your Surgeon Team if you are concerned about pain control, large amount of bleeding, blood clots, constipation, or if you experience signs of infection (fever, growing tenderness at the surgery site, a large amount of drainage, severe pain, foul-smelling drainage, redness or swelling.    Follow up with your primary care provider for continued medical care and hospital follow up in 2 weeks  .  General Surgery Clinic  [facility]  [address]  [address] [place] [address]   Appointments: " [phone]     When to call - Reach out to Urgent Care   Order Comments: If you are experiencing uncontrolled Nausea and Vomiting, uncontrolled pain, inability to urinate and uncomfortable, and in need of immediate care, and you are NOT able to reach your Surgeon Team, go to an Urgent Care clinic. Do NOT go to the Emergency Room unless you have shortness of breath, chest pain, or other signs of a medical emergency.     When to call - Reasons to Call 911   Order Comments: Call 911 immediately if you experience sudden-onset chest pain, arm weakness/numbness, slurred speech, or shortness of breath     Symptoms - Fever Management   Order Comments: A low grade fever can be expected after surgery. Your Provider many have prescribed an Opioid pain medication that also contains acetaminophen (TYLENOL) that may help with Fever management.  Do NOT take additional acetaminophen (TYLENOL) in combination with an Opioid/acetaminophen (TYLENOL) product. Read the labels on your Over The Counter (OTC) medications with care.     Symptoms - Reduced Urine Output   Order Comments: If it has been greater than 8 hours since you have urinated despite drinking plenty of water, call your Surgeon Team.     No driving or operating machinery   Order Comments: Do NOT drive any vehicle or operate mechanical equipment for 24 hours following the end of your surgery.  Even though you may feel normal, your reactions may be affected by Anesthesia medication you received.     No Alcohol   Order Comments: Do NOT drink alcoholic beverages for 24 hours following your surgery and while taking pain medications.     Diet Instructions   Order Comments: Follow your surgeon's orders for any diet restrictions.  If you did not receive any diet restrictions, you may drink clear liquids (apple juice, ginger ale, 7-up, broth, etc.), and progress to your regular diet as you feel able. It is important to stay well-hydrated after surgery and drink plenty of water.      Dressing / Wound Care - Wound   Order Comments: You have a clean dressing on your surgical wound. Dressing change instructions as follows: Your incisions are closed with dermabond (skin glue). This will flake off on its own over the next couple of weeks. Okay to let soapy watery run over these. Do not need to scrub. No lotions on incisions until fully healed.   Contact your Surgeon Team if you have increased redness, warmth around the surgical wound, and/or drainage from the surgical wound.     Discharge Instructions - Comfort and Pain Management   Order Comments: Pain after surgery is normal and expected. You will have some amount of pain after surgery. Your pain will improve with time. There are several things you can do to help reduce your pain including: rest, ice, and using pain medications as needed. Use pain interventions and don't wait until pain level is out of control. Contact your Surgeon Team if you have pain that persists or worsens after surgery despite rest, ice, and taking your medication(s) as prescribed. You may have a dry mouth, a sore throat, muscles aches or trouble sleeping, and these symptoms should go away after 24 hours.     Discharge Instructions - Rest   Order Comments: Rest and relax for the next 24 hours. Make arrangements to have someone stay with you overnight, and avoid hazardous and strenuous activities.  Do NOT make any important decisions for the next 24 hours.     Ice to affected area   Order Comments: Ice to operative site PRN     No lifting   Order Comments: No lifting over 15 pounds and no strenuous physical activity for 4 weeks.     Order Specific Question Answer Comments   Is discharge order? Yes      May return to work (WITH restrictions)   Order Comments: May return to work in 1 week with the following restrictions: No heavy lifting > 15 lbs until 8/3.     Order Specific Question Answer Comments   Is discharge order? Yes      Follow Up ([facility])   Order Comments:  Follow up with General Surgery Clinic, at [facility], within 2 weeks post cholecystectomy.      Appointments on [place] and/or [facility] (with [facility] or [facility] provider or service). Call [phone] if you haven't heard regarding these appointments within 7 days of discharge.            Home Health Care:     Not needed           Discharge Disposition:     Discharged to home      Condition at discharge: Stable    Pt was seen and discussed with Dr. Su on 7/6/25    Fang Schmitt MD  Surgery PGY5

## 2025-07-06 NOTE — ANESTHESIA CARE TRANSFER NOTE
Patient: Shannen Guzmán    Procedure: Procedure(s):  Laparoscopic cholecystectomy       Diagnosis: Acute cholecystitis [K81.0]  Diagnosis Additional Information: No value filed.    Anesthesia Type:   General     Note:    Oropharynx: oropharynx clear of all foreign objects  Level of Consciousness: awake  Oxygen Supplementation: face mask  Level of Supplemental Oxygen (L/min / FiO2): 6  Independent Airway: airway patency satisfactory and stable  Dentition: dentition unchanged  Vital Signs Stable: post-procedure vital signs reviewed and stable  Report to RN Given: handoff report given  Patient transferred to: PACU    Handoff Report: Identifed the Patient, Identified the Reponsible Provider, Reviewed the pertinent medical history, Discussed the surgical course, Reviewed Intra-OP anesthesia mangement and issues during anesthesia, Set expectations for post-procedure period and Allowed opportunity for questions and acknowledgement of understanding      Vitals:  Vitals Value Taken Time   BP     Temp     Pulse 79 07/06/25 13:06   Resp 15 07/06/25 13:06   SpO2 98 % 07/06/25 13:06   Vitals shown include unfiled device data.    Electronically Signed By: Reuben Morocho MD  July 6, 2025  1:07 PM

## 2025-07-06 NOTE — PLAN OF CARE
"/88 (BP Location: Right arm)   Pulse 56   Temp 98.7  F (37.1  C) (Oral)   Resp 16   Ht 1.753 m (5' 9\")   Wt 93 kg (205 lb)   LMP 06/30/2025   SpO2 99%   BMI 30.27 kg/m     Neuro: A&Ox4. No new neuro deficit   Cardiac: Afebrile. Declan HR in high 50s OVSS.   Respiratory: Sating >95% on RA.  GI/: Adequate urine output. No BM this shift   Diet/appetite: NPO for possible gallstone removal   Activity:  Independent.  Pain: At acceptable level on current regimen. C/O 8/10 abd pain, prn dilaudid x1 with relief. Heat pack also in use  Skin: No new deficits noted.  LDA's: Left piv infusing LR at ordered rate.     Plan: Continue with POC. Notify primary team with changes.   Goal Outcome Evaluation:      Plan of Care Reviewed With: patient    Overall Patient Progress: no changeOverall Patient Progress: no change               "

## 2025-07-06 NOTE — ANESTHESIA POSTPROCEDURE EVALUATION
Patient: Shannen Guzmán    Procedure: Procedure(s):  Laparoscopic cholecystectomy       Anesthesia Type:  General    Note:  Disposition: Outpatient   Postop Pain Control: Uneventful            Sign Out: Well controlled pain   PONV: No   Neuro/Psych: Uneventful            Sign Out: Acceptable/Baseline neuro status   Airway/Respiratory: Uneventful            Sign Out: Acceptable/Baseline resp. status   CV/Hemodynamics: Uneventful            Sign Out: Acceptable CV status; No obvious hypovolemia; No obvious fluid overload   Other NRE: NONE   DID A NON-ROUTINE EVENT OCCUR? No           Last vitals:  Vitals Value Taken Time   /77 07/06/25 14:15   Temp 37.3  C (99.2  F) 07/06/25 13:05   Pulse 72 07/06/25 14:25   Resp 15 07/06/25 14:25   SpO2 96 % 07/06/25 14:25   Vitals shown include unfiled device data.    Electronically Signed By: Kristopher Alvarez MD  July 6, 2025  2:25 PM

## 2025-07-06 NOTE — ANESTHESIA PROCEDURE NOTES
Airway       Patient location during procedure: OR       Procedure Start/Stop Times: 7/6/2025 10:28 AM  Staff -        Anesthesiologist:  Santos Wells MD       Resident/Fellow: Reuben Morocho MD       Performed By: resident  Consent for Airway        Urgency: elective  Indications and Patient Condition       Indications for airway management: dasha-procedural and airway protection       Induction type:intravenous       Mask difficulty assessment: 1 - vent by mask    Final Airway Details       Final airway type: endotracheal airway       Successful airway: ETT - single  Endotracheal Airway Details        ETT size (mm): 7.0       Cuffed: yes       Successful intubation technique: direct laryngoscopy       DL Blade Type: MAC 3       Grade View of Cords: 2       Adjucts: stylet       Position: Right       Measured from: gums/teeth       Secured at (cm): 22       Bite block used: None    Post intubation assessment        Placement verified by: capnometry, equal breath sounds and chest rise        Number of attempts at approach: 1       Number of other approaches attempted: 0       Secured with: pink tape       Ease of procedure: easy       Dentition: Unchanged and Intact    Medication(s) Administered   Medication Administration Time: 7/6/2025 10:28 AM    Additional Comments       Slightly anterior airway

## 2025-07-06 NOTE — ED NOTES
[facility]  Expected date:   Expected time:   Means of arrival:   Comments:  WB transfer acute tyrel

## 2025-07-06 NOTE — ANESTHESIA PREPROCEDURE EVALUATION
Anesthesia Pre-Procedure Evaluation    Patient: Shannen Guzmán   MRN: [id number] : 1980          Procedure : Procedure(s):  Laparoscopic cholecystectomy         Past Medical History:   Diagnosis Date    Acne     ASCUS on Pap smear 2007    colposcopy 08- WNL. paps NIL since    Headache(784.0)     Hidradenitides, suppurative     Human papillomavirus in conditions classified elsewhere and of unspecified site     NONSPECIFIC MEDICAL HISTORY     vaginal delivery , RH neg    Other acne       Past Surgical History:   Procedure Laterality Date    HC LASER SURGERY OF CERVIX      laser to genital area, ? abnl paps      Allergies   Allergen Reactions    Bactrim [Sulfamethoxazole W/Trimethoprim] Rash      Social History     Tobacco Use    Smoking status: Every Day     Current packs/day: 0.50     Average packs/day: 0.5 packs/day for 23.7 years (11.9 ttl pk-yrs)     Types: Cigarettes     Start date: 10/22/2001    Smokeless tobacco: Never   Substance Use Topics    Alcohol use: Yes     Comment: Very ocassional      Wt Readings from Last 1 Encounters:   25 93 kg (205 lb)        Anesthesia Evaluation            ROS/MED HX  ENT/Pulmonary:  - neg pulmonary ROS     Neurologic:     (+)      migraines,                          Cardiovascular:  - neg cardiovascular ROS     METS/Exercise Tolerance:     Hematologic:       Musculoskeletal:  - neg musculoskeletal ROS     GI/Hepatic:     (+)          cholecystitis/cholelithiasis,          Renal/Genitourinary:  - neg Renal ROS     Endo:  - neg endo ROS     Psychiatric/Substance Use:       Infectious Disease:  - neg infectious disease ROS     Malignancy:  - neg malignancy ROS     Other:              Physical Exam  Airway  Mallampati: II  TM distance: <3 FB  Neck ROM: full  Upper bite lip test: II  Mouth opening: >= 4 cm    Cardiovascular - normal exam   Dental   (+) Minor Abnormalities - some fillings, tiny chips      Pulmonary Breath sounds clear to  "auscultation        Neurological - normal exam  She appears awake, alert and oriented x3.    Other Findings       OUTSIDE LABS:  CBC:   Lab Results   Component Value Date    WBC 12.4 (H) 07/05/2025    WBC 11.0 07/04/2025    HGB 13.6 07/05/2025    HGB 14.0 07/04/2025    HCT 39.1 07/05/2025    HCT 40.6 07/04/2025     (L) 07/05/2025     07/04/2025     BMP:   Lab Results   Component Value Date     07/05/2025     07/04/2025    POTASSIUM 3.9 07/05/2025    POTASSIUM 3.8 07/04/2025    CHLORIDE 105 07/05/2025    CHLORIDE 108 (H) 07/04/2025    CO2 20 (L) 07/05/2025    CO2 22 07/04/2025    BUN 9.2 07/05/2025    BUN 19.3 07/04/2025    CR 0.55 07/05/2025    CR 0.77 07/04/2025     (H) 07/05/2025     (H) 07/04/2025     COAGS: No results found for: \"PTT\", \"INR\", \"FIBR\"  POC:   Lab Results   Component Value Date    HCG Negative 09/08/2022    HCGS Negative 07/04/2025     HEPATIC:   Lab Results   Component Value Date    ALBUMIN 3.9 07/05/2025    PROTTOTAL 6.4 07/05/2025    ALT 18 07/05/2025    AST 12 07/05/2025    ALKPHOS 50 07/05/2025    BILITOTAL 0.5 07/05/2025     OTHER:   Lab Results   Component Value Date    A1C 5.5 10/09/2023    UNRULY 8.3 (L) 07/05/2025    LIPASE 15 07/05/2025    TSH 2.43 11/06/2015    SED 87 (H) 06/12/2003       Anesthesia Plan    ASA Status:  2      NPO Status: NPO Appropriate   Anesthesia Type: General.  Airway: oral.  Induction: intravenous.  Maintenance: Balanced.   Techniques and Equipment:       - Monitoring Plan: standard ASA monitoring, train of four monitoring, processed EEG monitor     Consents    Anesthesia Plan(s) and associated risks, benefits, and realistic alternatives discussed. Questions answered and patient/representative(s) expressed understanding.     - Discussed: anesthesiologist     - Discussed with:                Postoperative Care    Pain management: plan for postoperative opioid use, multimodal analgesia.     Comments:                 Reuben" "MD Rashawn    I have reviewed the pertinent notes and labs in the chart from the past 30 days and (re)examined the patient.  Any updates or changes from those notes are reflected in this note.    Clinically Significant Risk Factors Present on Admission           # Hypocalcemia: Lowest Ca = 8.3 mg/dL in last 2 days, will monitor and replace as appropriate                   # Obesity: Estimated body mass index is 30.27 kg/m  as calculated from the following:    Height as of this encounter: 1.753 m (5' 9\").    Weight as of this encounter: 93 kg (205 lb).                    "

## 2025-07-06 NOTE — PROGRESS NOTES
Preop note:    Patient seen in PACU, she was very tender in RUQ consistent with acute cholecystitis. She has had symptoms for 3 days now. I explained how the surgery is performed. I went over the risks of the surgery including bleeding, infection, death, and injury to normal organ. We specifically discussed bile duct, hepatic blood vessel and intestinal injury in detail. She understands the possibility of these risks and the need for additional surgery or procedures if they occur. I quoted a 5% conversion rate. She is at a much higher risk of complications because of body habitus and that she has had untreated acute cholecystitis for so long, I explained that to her as well. All questions were answered and will proceed to surgery.    Lionel Su MD

## 2025-07-06 NOTE — H&P
[facility]    History and Physical - Emergency General Surgery Service       Date of Admission:  7/5/2025    Assessment & Plan: Surgery   Shannen Guzmán is a 45 year old female with a history of hidradenitis suppurative, headache, and ASCUS who presented to the the ED on 7/5 with abdominal pain. [facility] ED work up was significant for WBC 12.4, liver enzymes, and bilirubin within normal limits, RUQ US with evidence of cholelithiasis and borderline gallbladder wall thickening, no pericholecystic fluid and negative sonographic Richmond's sign. She was transferred to the [place] ED for general surgery consultation. Work up and presentation consistent with acute cholecystitis.     - Plan for laparoscopic cholecystectomy tomorrow morning  - Will continue Zosyn   - NPO, IVF  - Consent signed  - Pre-op orders placed     The patient's care was discussed with the attending physician.    Janey Valencia DO  [facility]  All communications related to this note should be expressed to resident/[place] on call for this team at the time of your communication. Please be advised that not all members of this team utilize vocera.  ______________________________________________________________________    Chief Complaint   Abdominal pain    History of Present Illness   Shannen Guzmán is a 45 year old female with a history of hidradenitis suppurative, headache, and ASCUS who presented to the the ED on 7/5 with abdominal pain, of note she was seen in the ED on 7/4 with workup revealing RUQ US with evidence of cholelithiasis without signs of acute cholecystitis. Upon re-presentation to the [facility] ED her work up was significant for WBC 12.4 and RUQ US with evidence of cholelithiasis and borderline gallbladder wall thickening, no pericholecystic fluid and negative sonographic Richmond's sign. General surgery was consulted for further evaluation.      Patient seen at bedside. Reports yesterday she started to have abdominal pain with associated nausea and vomiting. Reports that after discharge yesterday from the ED her pain worsened. She has been unable to tolerate a regular diet. States she has had episodes similar to this previously which she attributed to gastric reflux. Denies any dark colored urine or light colored stools. No prior abdominal surgical history. She is not on any blood thinners. No family history of bleeding or clotting disorders.     Past Medical History    Past Medical History:   Diagnosis Date    Acne     ASCUS on Pap smear 11/19/2007    colposcopy 1/14/08- WNL. paps NIL since    Headache(784.0)     Hidradenitides, suppurative     Human papillomavirus in conditions classified elsewhere and of unspecified site     NONSPECIFIC MEDICAL HISTORY     vaginal delivery 2003, RH neg    Other acne        Past Surgical History   Past Surgical History:   Procedure Laterality Date    HC LASER SURGERY OF CERVIX      laser to genital area, ? abnl paps       Prior to Admission Medications   Prior to Admission Medications   Prescriptions Last Dose Informant Patient Reported? Taking?   Ibuprofen (ADVIL PO)   Yes No   naproxen (NAPROSYN) 500 MG tablet   No No   Sig: Take 1 tablet (500 mg) by mouth 2 times daily as needed for moderate pain.   ondansetron (ZOFRAN ODT) 4 MG ODT tab 7/5/2025  No Yes   Sig: Take 2 tablets (8 mg) by mouth every 8 hours as needed for nausea.   oxyCODONE (ROXICODONE) 5 MG tablet   No No   Sig: Take 1 tablet (5 mg) by mouth every 6 hours as needed for breakthrough pain.      Facility-Administered Medications: None        Review of Systems    The 10 point Review of Systems is negative other than noted in the HPI or here.     Social History   I have reviewed this patient's social history and updated it with pertinent information if needed.  Social History     Tobacco Use    Smoking status: Every Day     Current packs/day: 0.50      Average packs/day: 0.5 packs/day for 23.7 years (11.9 ttl pk-yrs)     Types: Cigarettes     Start date: 10/22/2001    Smokeless tobacco: Never   Vaping Use    Vaping status: Never Used   Substance Use Topics    Alcohol use: Yes     Comment: Very ocassional    Drug use: No     Family History   I have reviewed this patient's family history and updated it with pertinent information if needed.  Family History   Problem Relation Age of Onset    Cancer Maternal Grandmother 74        01/2019    Cancer Paternal Grandmother     Cancer Paternal Grandfather     Other Cancer Father     C.A.D. No family hx of     Diabetes No family hx of     Hypertension No family hx of     Cerebrovascular Disease No family hx of     Breast Cancer No family hx of     Cancer - colorectal No family hx of     Prostate Cancer No family hx of      Allergies   Allergies   Allergen Reactions    Bactrim [Sulfamethoxazole W/Trimethoprim] Rash        Physical Exam   Vital Signs: Temp: 98.7  F (37.1  C) Temp src: Oral BP: 122/88 Pulse: 56   Resp: 16 SpO2: 99 % O2 Device: None (Room air)    Weight: 205 lbs 0 ozNo intake or output data in the 24 hours ending 07/05/25 2134     Gen: Alert and conversational adult female in NAD  Eyes: Nonicteric  ENT: Mucous Membranes Moist  Pulm: Nonlabored Breathing on RA  CV: Normal heart rate, normotensive   Abd: Soft, non-distended, tenderness to palpation RUQ, positive Richmond's sign, not peritonitic   : No salcido present  Skin: WWP  Extremities: No peripheral edema  Neuro: Awake and alert  Psych: Appropriate in conversation         Data     I have personally reviewed the following data over the past 24 hrs:    12.4 (H)  \   13.6   / 145 (L)     135 105 9.2 /  105 (H)   3.9 20 (L) 0.55 \     ALT: 18 AST: 12 AP: 50 TBILI: 0.5   ALB: 3.9 TOT PROTEIN: 6.4 LIPASE: 15       Imaging results reviewed over the past 24 hrs:   Recent Results (from the past 24 hours)   US Abdomen Limited (RUQ)    Narrative    EXAM: US ABDOMEN  LIMITED  LOCATION: [facility]  DATE: 7/5/2025    INDICATION: Known cholelithiasis, ultrasound yesterday showed no signs of cholecystitis or obstruction however pain recurred, please eval for cholecystitis  COMPARISON: CT and ultrasound 7/4/2025  TECHNIQUE: Limited abdominal ultrasound.    FINDINGS:    GALLBLADDER: Cholelithiasis with multiple shadowing stones including nonmobile stones in the neck. Borderline gallbladder wall thickness to 5 mm, unchanged since the ultrasound yesterday when it measured 6 mm. No pericholecystic fluid. Sonographic Richmond   sign is reportedly negative.    BILE DUCTS: No biliary dilatation. The common duct measures 5 mm.    LIVER: Normal parenchyma with smooth contour. No focal mass. The portal vein is patent with flow in the normal direction.    RIGHT KIDNEY: No hydronephrosis.    PANCREAS: The visualized portions are normal.      Impression    IMPRESSION:  1.  Cholelithiasis with nonmobile stone in the gallbladder neck. Stable borderline gallbladder wall thickening. No pericholecystic fluid and negative sonographic Richmond's sign. Findings are equivocal for acute cholecystitis. Consider a HIDA scan for   further evaluation.

## 2025-07-06 NOTE — BRIEF OP NOTE
[facility]    Brief Operative Note    Pre-operative diagnosis: Acute cholecystitis [K81.0]  Post-operative diagnosis Same as pre-operative diagnosis    Procedure: Laparoscopic cholecystectomy, N/A - Abdomen    Surgeon: Surgeons and Role:     * Benny Su MD - Primary     * Fang Schmitt MD - Resident - Assisting  Anesthesia: General   Estimated Blood Loss: 10 mL    Drains: None  Specimens:   ID Type Source Tests Collected by Time Destination   1 : Gallbladder Tissue Gallbladder SURGICAL PATHOLOGY EXAM Benny Su MD 7/6/2025 11:43 AM      Findings:   Inflamed gallbladder. .  Complications: None.  Implants: * No implants in log *    Plan:   If not issues, okay to discharge from phase 2. Otherwise will return to floor.

## 2025-07-07 ENCOUNTER — TELEPHONE (OUTPATIENT)
Dept: SURGERY | Facility: CLINIC | Age: 45
End: 2025-07-07
Payer: COMMERCIAL

## 2025-07-07 ENCOUNTER — PATIENT OUTREACH (OUTPATIENT)
Dept: CARE COORDINATION | Facility: CLINIC | Age: 45
End: 2025-07-07
Payer: COMMERCIAL

## 2025-07-07 NOTE — TELEPHONE ENCOUNTER
Patient called and left a voicemail on surgery scheduling line requesting to schedule a follow up appointment after having surgery over the weekend.     Lissy Lombardi on 7/7/2025 at 2:32 PM

## 2025-07-08 ENCOUNTER — PATIENT OUTREACH (OUTPATIENT)
Dept: CARE COORDINATION | Facility: CLINIC | Age: 45
End: 2025-07-08
Payer: COMMERCIAL

## 2025-07-08 ENCOUNTER — PATIENT OUTREACH (OUTPATIENT)
Dept: SURGERY | Facility: CLINIC | Age: 45
End: 2025-07-08
Payer: COMMERCIAL

## 2025-07-08 NOTE — PROGRESS NOTES
Connected Care Resource Center: [facility]    Background: Transitional Care Management program identified per system criteria and reviewed by [facility] [place] team for possible outreach.    Assessment: Upon chart review, CCR Team member will not proceed with patient outreach related to this episode of Transitional Care Management program due to reason below:    Patient has active communication with a nurse, provider or care team for reason of post-hospital follow up plan.  Outreach call by CCRC team not indicated to minimize duplicative efforts.     Plan: Transitional Care Management episode addressed appropriately per reason noted above.      Rosina Long  Community Health Worker  [facility]  [phone]      *Connected Care Resource Team does NOT follow patient ongoing. Referrals are identified based on internal discharge reports and the outreach is to ensure patient has an understanding of their discharge instructions.

## 2025-07-08 NOTE — PROGRESS NOTES
07/08/25    9:46 AM     Shannen Guzmán is a patient of Dr. Lionel Su that underwent laparoscopic cholecystectomy approximately 2 days ago (7/6).  Attempted to contact patient via telephone for a status update and review post-op  teaching.  LM on VM to call office.  Await return call.      Of note:  Pathology:  Pending  Wound:  Skin Sealant  Follow-up:  Routine  Restrictions:  - No strenuous exercise for 3-4 weeks  - No lifting, pushing, pulling more than 15-20 pounds for 3-4 weeks  New medications:  Advil, Tylenol, Oxycodone, Zofran  Equipment/Supplies:  None  Diet:  Regular

## 2025-07-09 ENCOUNTER — PATIENT OUTREACH (OUTPATIENT)
Dept: CARE COORDINATION | Facility: CLINIC | Age: 45
End: 2025-07-09
Payer: COMMERCIAL

## 2025-07-09 NOTE — PROGRESS NOTES
RN Post-Op/Post-Discharge Care Coordination Note    Spoke with Patient.    Support  Patient able to care for self independently     Health Status  Nausea/Vomiting: Patient denies nausea/vomiting.  Eating/drinking: Patient is able to eat and drink without any complaints.  Diet:  Regular  Bowel habits: Patient reports constipation with last BM 4 days ago. Passing flatus and is not uncomfortable. Recommended medicating with OTC laxative per package instructions.  Drains (JACKIE): N/A  Fevers/chills: Patient denies any fever or chills.  Incisions: Patient denies any signs and symptoms of infection..  Wound closure:  Skin Sealant  Pain: Improving. Discussed weaning off of narcotics and transitioning to OTC analgesics.    Activity/Restrictions  Discussed importance of early mobility after surgery. Suggested getting up and walking and at a minimum walking room to room every hour.    Adherence of the following restrictions:   No lifting in excess of 15-20 pounds for 3-4 weeks    Reports vaginal bleeding; however, she had her menses approximately two weeks ago.  Recommended contacting her GYN or PCP.    Equipment  None    Pathology reviewed with patient:  No, not yet available    Forms/Letters  No    All of her questions were answered including reviewing restrictions and wound care.  She will call this office if she has any further questions and/or concerns.      In lieu of a post-op clinic patient that patient would like to be contacted in approximately 7-10 days via telephone.    A copy of this note was routed to the primary surgeon.      Whom and When to Call  Patient acknowledges understanding of how to manage any medication changes and   when to seek medical care.     Patient advised that if after hour medical concerns arise to please call [phone] and choose option 4 to speak to the physician on call.

## 2025-07-14 ENCOUNTER — PATIENT OUTREACH (OUTPATIENT)
Dept: SURGERY | Facility: CLINIC | Age: 45
End: 2025-07-14

## 2025-07-15 NOTE — ED PROVIDER NOTES
ED Provider Note  [facility]      History     Chief Complaint   Patient presents with    Abdominal Pain     Generalized abdominal and back pain that started a couple hours ago, woke up from the pain. 3-4 episodes of emesis     HPI  Shannen Guzmán is a 45 year old female who presented to the ED with abdominal and flank pain.  She denied any fevers or chills.  She complains mostly of right upper quadrant pain.  Denies any nausea or vomiting.    Past Medical History  Past Medical History:   Diagnosis Date    Acne     ASCUS on Pap smear 11/19/2007    colposcopy 1/14/08- WNL. paps NIL since    Headache(784.0)     Hidradenitides, suppurative     Human papillomavirus in conditions classified elsewhere and of unspecified site     NONSPECIFIC MEDICAL HISTORY     vaginal delivery 2003, RH neg    Other acne      Past Surgical History:   Procedure Laterality Date    HC LASER SURGERY OF CERVIX      laser to genital area, ? abnl paps     acetaminophen (TYLENOL) 500 MG tablet  Ibuprofen (ADVIL PO)  ibuprofen (ADVIL/MOTRIN) 200 MG tablet  ibuprofen (ADVIL/MOTRIN) 600 MG tablet  oxyCODONE (ROXICODONE) 5 MG tablet      Allergies   Allergen Reactions    Bactrim [Sulfamethoxazole W/Trimethoprim] Rash     Family History  Family History   Problem Relation Age of Onset    Cancer Maternal Grandmother 74        01/2019    Cancer Paternal Grandmother     Cancer Paternal Grandfather     Other Cancer Father     C.A.D. No family hx of     Diabetes No family hx of     Hypertension No family hx of     Cerebrovascular Disease No family hx of     Breast Cancer No family hx of     Cancer - colorectal No family hx of     Prostate Cancer No family hx of      Social History   Social History     Tobacco Use    Smoking status: Every Day     Current packs/day: 0.50     Average packs/day: 0.5 packs/day for 23.7 years (11.9 ttl pk-yrs)     Types: Cigarettes     Start date: 10/22/2001    Smokeless tobacco: Never   Vaping Use  "   Vaping status: Never Used   Substance Use Topics    Alcohol use: Yes     Comment: Very ocassional    Drug use: No      Past medical history, past surgical history, medications, allergies, family history, and social history were reviewed with the patient. No additional pertinent items.   A medically appropriate review of systems was performed with pertinent positives and negatives noted in the HPI, and all other systems negative.    Physical Exam   BP: 115/74  Pulse: 76  Temp: 97.4  F (36.3  C)  Resp: 24  Height: 175.3 cm (5' 9\")  Weight: 93 kg (205 lb)  SpO2: 100 %  Physical Exam  Vitals and nursing note reviewed.   Constitutional:       General: She is not in acute distress.     Appearance: Normal appearance. She is not diaphoretic.   HENT:      Head: Atraumatic.      Mouth/Throat:      Mouth: Mucous membranes are moist.   Eyes:      General: No scleral icterus.     Conjunctiva/sclera: Conjunctivae normal.   Cardiovascular:      Rate and Rhythm: Normal rate.      Heart sounds: Normal heart sounds.   Pulmonary:      Effort: No respiratory distress.      Breath sounds: Normal breath sounds.   Abdominal:      General: Abdomen is flat.      Tenderness: There is abdominal tenderness in the right upper quadrant. There is no guarding or rebound.   Musculoskeletal:      Cervical back: Neck supple.   Skin:     General: Skin is warm.      Findings: No rash.   Neurological:      Mental Status: She is alert.           ED Course, Procedures, & Data      Procedures          Results for orders placed or performed during the hospital encounter of 07/04/25   CT Abdomen Pelvis w/o Contrast    Impression    IMPRESSION:   1.  Cholelithiasis. The gallbladder wall may be thickened. Consider ultrasound for further evaluation.  2.  No urinary stone or hydronephrosis.  3.  Bilateral ovarian cysts measuring up to 3.3 cm.    REFERENCE:  Management of Incidental Adnexal Findings on CT and MRI: A White Paper of the ACR Incidental Findings " Committee. J Am Nico Radiol 2020; 17(2):248-254.    Premenopausal or <50 if status unkown:    Equal to or <5 cm: No further imaging.  >5 cm on CT: Ultrasound.  >5 cm on MRI: Ultrasound in 6-12 months if not fully characterized. No follow-up if fully characterized.  >7 cm on MRI: Ultrasound in 6-12 months even if fully characterized.         US Abdomen Limited (RUQ)    Impression    IMPRESSION:  1.  Cholelithiasis without evidence of cholecystitis.       Comprehensive metabolic panel   Result Value Ref Range    Sodium 142 135 - 145 mmol/L    Potassium 3.8 3.4 - 5.3 mmol/L    Carbon Dioxide (CO2) 22 22 - 29 mmol/L    Anion Gap 12 7 - 15 mmol/L    Urea Nitrogen 19.3 6.0 - 20.0 mg/dL    Creatinine 0.77 0.51 - 0.95 mg/dL    GFR Estimate >90 >60 mL/min/1.73m2    Calcium 8.7 (L) 8.8 - 10.4 mg/dL    Chloride 108 (H) 98 - 107 mmol/L    Glucose 113 (H) 70 - 99 mg/dL    Alkaline Phosphatase 50 40 - 150 U/L    AST 18 0 - 45 U/L    ALT 22 0 - 50 U/L    Protein Total 6.7 6.4 - 8.3 g/dL    Albumin 4.1 3.5 - 5.2 g/dL    Bilirubin Total 0.2 <=1.2 mg/dL   Result Value Ref Range    Lipase 22 13 - 60 U/L   UA with Microscopic reflex to Culture    Specimen: Urine, Midstream   Result Value Ref Range    Color Urine Yellow Colorless, Straw, Light Yellow, Yellow    Appearance Urine Clear Clear    Glucose Urine Negative Negative mg/dL    Bilirubin Urine Negative Negative    Ketones Urine 10 (A) Negative mg/dL    Specific Gravity Urine 1.033 1.003 - 1.035    Blood Urine Negative Negative    pH Urine 5.5 5.0 - 7.0    Protein Albumin Urine 10 (A) Negative mg/dL    Urobilinogen Urine Normal Normal mg/dL    Nitrite Urine Negative Negative    Leukocyte Esterase Urine Negative Negative    Mucus Urine Present (A) None Seen /LPF    RBC Urine 2 <=2 /HPF    WBC Urine 1 <=5 /HPF    Squamous Epithelials Urine 3 (H) <=1 /HPF   HCG qualitative pregnancy (blood)   Result Value Ref Range    hCG Serum Qualitative Negative Negative   CBC with platelets and  differential   Result Value Ref Range    WBC Count 11.0 4.0 - 11.0 10e3/uL    RBC Count 4.64 3.80 - 5.20 10e6/uL    Hemoglobin 14.0 11.7 - 15.7 g/dL    Hematocrit 40.6 35.0 - 47.0 %    MCV 88 78 - 100 fL    MCH 30.2 26.5 - 33.0 pg    MCHC 34.5 31.5 - 36.5 g/dL    RDW 13.4 10.0 - 15.0 %    Platelet Count 156 150 - 450 10e3/uL    % Neutrophils 85 %    % Lymphocytes 11 %    % Monocytes 3 %    % Eosinophils 1 %    % Basophils 0 %    % Immature Granulocytes 0 %    NRBCs per 100 WBC 0 <1 /100    Absolute Neutrophils 9.4 (H) 1.6 - 8.3 10e3/uL    Absolute Lymphocytes 1.2 0.8 - 5.3 10e3/uL    Absolute Monocytes 0.3 0.0 - 1.3 10e3/uL    Absolute Eosinophils 0.1 0.0 - 0.7 10e3/uL    Absolute Basophils 0.0 0.0 - 0.2 10e3/uL    Absolute Immature Granulocytes 0.0 <=0.4 10e3/uL    Absolute NRBCs 0.0 10e3/uL     Medications   sodium chloride 0.9% BOLUS 1,000 mL (0 mLs Intravenous Stopped 7/4/25 0546)   HYDROmorphone (DILAUDID) injection 1 mg (1 mg Intravenous $Given 7/4/25 0515)          Critical care was not performed.     Medical Decision Making  The patient's presentation was of moderate complexity (an undiagnosed new problem with uncertain prognosis).    The patient's evaluation involved:  review of external note(s) from 3+ sources (see separate area of note for details)    The patient's management necessitated high risk (a parenteral controlled substance).    Assessment & Plan    45-year-old female presents with right upper quadrant pain.  She also complains of nausea but no vomiting.  She denies any other symptoms at this time.  Labs, CT abdomen pelvis, Dilaudid, and IV fluids ordered.    Labs are relatively unremarkable.  CT abdomen pelvis showed cholelithiasis and recommended ultrasound for further workup.  Patient was signed out pending right upper quadrant ultrasound to the oncoming doctor.  See their note for further details.    I have reviewed the nursing notes. I have reviewed the findings, diagnosis, plan and need for  follow up with the patient.    Discharge Medication List as of 7/4/2025  9:14 AM        START taking these medications    Details   ondansetron (ZOFRAN ODT) 4 MG ODT tab Take 2 tablets (8 mg) by mouth every 8 hours as needed for nausea., Disp-10 tablet, R-0, E-Prescribe      naproxen (NAPROSYN) 500 MG tablet Take 1 tablet (500 mg) by mouth 2 times daily as needed for moderate pain., Disp-20 tablet, R-0, E-Prescribe      oxyCODONE (ROXICODONE) 5 MG tablet Take 1 tablet (5 mg) by mouth every 6 hours as needed for breakthrough pain., Disp-12 tablet, R-0, E-Prescribe             Final diagnoses:   Symptomatic cholelithiasis       Cosme Jones MD  [facility] EMERGENCY DEPARTMENT  7/4/2025     Cosme Jones MD  07/15/25 1367

## 2025-07-16 ENCOUNTER — PATIENT OUTREACH (OUTPATIENT)
Dept: SURGERY | Facility: CLINIC | Age: 45
End: 2025-07-16
Payer: COMMERCIAL

## 2025-07-16 LAB
PATH REPORT.COMMENTS IMP SPEC: NORMAL
PATH REPORT.COMMENTS IMP SPEC: NORMAL
PATH REPORT.FINAL DX SPEC: NORMAL
PATH REPORT.GROSS SPEC: NORMAL
PATH REPORT.MICROSCOPIC SPEC OTHER STN: NORMAL
PATH REPORT.RELEVANT HX SPEC: NORMAL
PHOTO IMAGE: NORMAL

## 2025-07-16 NOTE — PROGRESS NOTES
07/16/25    8:42 AM     Patient due for 7 day post procedure telephone follow up.  Pathology remains pending.  Call placed to pathology for update from cholecystecomy done on 7/6.    Case number: [id number]  Dr. Fuentes  [phone]  [email or web address]    Called providers office and was asked to send him an email.      07/16/25    10:09 AM      Shannen Guzmán is a patient of Dr. Lionel Su that recently underwent a surgical procedure (laparoscopic cholecystectomy).  The patient was contacted via telephone approximately 7-10 days ago for a status update and post-op teaching.  In lieu of a clinic visit, the patient requested to be contacted at a later date by an RN for an assessment.    Attempted to contact patient via telephone for a status update.  LM on  to call office.     Pathology:  Final Diagnosis   Gallbladder, cholecystectomy:  - Acute cholecystitis with cholelithiasis  - Mucosal ulceration, hemorrhage and necrosis

## 2025-07-17 NOTE — PROGRESS NOTES
07/17/25    11:52 AM     Attempted to contact patient x 2 for 7-10 day telephone call/status update.  LM on VM to call office-contact information provided.

## (undated) DEVICE — LINEN TOWEL PACK X6 WHITE 5487

## (undated) DEVICE — ENDO TROCAR BLUNT TIP KII BALLOON 12X100MM C0R47

## (undated) DEVICE — ENDO TROCAR SLEEVE KII Z-THREADED 05X100MM CTS02

## (undated) DEVICE — SYR 10ML FINGER CONTROL W/O NDL 309695

## (undated) DEVICE — SU VICRYL+ 0 27 UR6 VLT VCP603H

## (undated) DEVICE — SOL WATER IRRIG 1000ML BOTTLE 2F7114

## (undated) DEVICE — CLIP ENDO HEMO-LOC GREEN MED/LG 544230

## (undated) DEVICE — LIGHT HANDLE X1 31140133

## (undated) DEVICE — ENDO POUCH UNIV RETRIEVAL SYSTEM INZII 10MM CD001

## (undated) DEVICE — PREP CHLORAPREP 26ML TINTED HI-LITE ORANGE 930815

## (undated) DEVICE — ENDO TROCAR FIRST ENTRY KII FIOS Z-THRD 05X100MM CTF03

## (undated) DEVICE — GLOVE PROTEXIS POWDER FREE ORANGE 7.5  2D72PT75X

## (undated) DEVICE — SOL NACL 0.9% INJ 1000ML BAG 2B1324X

## (undated) DEVICE — LINEN TOWEL PACK X30 5481

## (undated) DEVICE — Device

## (undated) DEVICE — GLOVE PROTEXIS BLUE W/NEU-THERA 7.5  2D73EB75

## (undated) DEVICE — SYR 10ML LL W/O NDL 302995

## (undated) DEVICE — ENDO SCOPE WARMER SEAL  C3101

## (undated) DEVICE — SYR 30ML SLIP TIP W/O NDL 302833

## (undated) DEVICE — SUCTION IRR STRYKERFLOW II W/TIP 250-070-520

## (undated) DEVICE — SU VICRYL+ 0 54 UNDYED VCP608H

## (undated) DEVICE — ESU PENCIL SMOKE EVAC W/ROCKER SWITCH 0703-047-000

## (undated) DEVICE — COVER CAMERA IN-LIGHT DISP LT-C02

## (undated) DEVICE — ANTIFOG SOLUTION W/FOAM PAD 31142527

## (undated) DEVICE — SOL NACL 0.9% IRRIG 1000ML BOTTLE 2F7124

## (undated) DEVICE — SUCTION MANIFOLD NEPTUNE 2 SYS 4 PORT 0702-020-000

## (undated) DEVICE — ESU GROUND PAD ADULT W/CORD E7507

## (undated) RX ORDER — HYDROMORPHONE HCL IN WATER/PF 6 MG/30 ML
PATIENT CONTROLLED ANALGESIA SYRINGE INTRAVENOUS
Status: DISPENSED
Start: 2025-07-06

## (undated) RX ORDER — FENTANYL CITRATE 50 UG/ML
INJECTION, SOLUTION INTRAMUSCULAR; INTRAVENOUS
Status: DISPENSED
Start: 2025-07-06

## (undated) RX ORDER — OXYCODONE HYDROCHLORIDE 5 MG/1
TABLET ORAL
Status: DISPENSED
Start: 2025-07-06

## (undated) RX ORDER — HYDROMORPHONE HYDROCHLORIDE 1 MG/ML
INJECTION, SOLUTION INTRAMUSCULAR; INTRAVENOUS; SUBCUTANEOUS
Status: DISPENSED
Start: 2025-07-06

## (undated) RX ORDER — ONDANSETRON 2 MG/ML
INJECTION INTRAMUSCULAR; INTRAVENOUS
Status: DISPENSED
Start: 2025-07-06

## (undated) RX ORDER — LIDOCAINE HYDROCHLORIDE AND EPINEPHRINE 10; 10 MG/ML; UG/ML
INJECTION, SOLUTION INFILTRATION; PERINEURAL
Status: DISPENSED
Start: 2025-07-06